# Patient Record
Sex: MALE | Race: WHITE | NOT HISPANIC OR LATINO | Employment: FULL TIME | RURAL
[De-identification: names, ages, dates, MRNs, and addresses within clinical notes are randomized per-mention and may not be internally consistent; named-entity substitution may affect disease eponyms.]

---

## 2017-05-01 ENCOUNTER — CONVERSION ENCOUNTER (OUTPATIENT)
Dept: FAMILY MEDICINE CLINIC | Facility: CLINIC | Age: 37
End: 2017-05-01

## 2018-03-15 ENCOUNTER — CONVERSION ENCOUNTER (OUTPATIENT)
Dept: FAMILY MEDICINE CLINIC | Facility: CLINIC | Age: 38
End: 2018-03-15

## 2019-06-04 VITALS
HEART RATE: 88 BPM | RESPIRATION RATE: 18 BRPM | OXYGEN SATURATION: 97 % | DIASTOLIC BLOOD PRESSURE: 74 MMHG | WEIGHT: 205.25 LBS | HEIGHT: 73 IN | OXYGEN SATURATION: 96 % | RESPIRATION RATE: 18 BRPM | HEART RATE: 104 BPM | BODY MASS INDEX: 27.51 KG/M2 | WEIGHT: 207.6 LBS | SYSTOLIC BLOOD PRESSURE: 111 MMHG | DIASTOLIC BLOOD PRESSURE: 82 MMHG | SYSTOLIC BLOOD PRESSURE: 127 MMHG

## 2019-07-22 ENCOUNTER — OFFICE VISIT (OUTPATIENT)
Dept: FAMILY MEDICINE CLINIC | Facility: CLINIC | Age: 39
End: 2019-07-22

## 2019-07-22 VITALS
WEIGHT: 225.6 LBS | DIASTOLIC BLOOD PRESSURE: 98 MMHG | TEMPERATURE: 98.2 F | OXYGEN SATURATION: 98 % | HEART RATE: 88 BPM | HEIGHT: 72 IN | SYSTOLIC BLOOD PRESSURE: 140 MMHG | BODY MASS INDEX: 30.56 KG/M2 | RESPIRATION RATE: 19 BRPM

## 2019-07-22 DIAGNOSIS — F33.1 MODERATE EPISODE OF RECURRENT MAJOR DEPRESSIVE DISORDER (HCC): Primary | ICD-10-CM

## 2019-07-22 DIAGNOSIS — F41.3 OTHER MIXED ANXIETY DISORDERS: ICD-10-CM

## 2019-07-22 PROBLEM — F32.A DEPRESSION: Status: ACTIVE | Noted: 2019-07-22

## 2019-07-22 PROCEDURE — 99214 OFFICE O/P EST MOD 30 MIN: CPT | Performed by: FAMILY MEDICINE

## 2019-07-22 RX ORDER — CITALOPRAM 20 MG/1
20 TABLET ORAL DAILY
Qty: 30 TABLET | Refills: 1 | Status: SHIPPED | OUTPATIENT
Start: 2019-07-22 | End: 2019-09-19 | Stop reason: SINTOL

## 2019-07-22 RX ORDER — HYDROXYZINE HYDROCHLORIDE 25 MG/1
25 TABLET, FILM COATED ORAL 3 TIMES DAILY PRN
Qty: 60 TABLET | Refills: 1 | Status: SHIPPED | OUTPATIENT
Start: 2019-07-22 | End: 2019-09-19

## 2019-07-22 RX ORDER — GABAPENTIN 100 MG/1
100 CAPSULE ORAL 3 TIMES DAILY PRN
Qty: 60 CAPSULE | Refills: 1 | Status: SHIPPED | OUTPATIENT
Start: 2019-07-22 | End: 2019-08-09 | Stop reason: SDUPTHER

## 2019-08-09 DIAGNOSIS — F41.3 OTHER MIXED ANXIETY DISORDERS: ICD-10-CM

## 2019-08-09 DIAGNOSIS — F33.1 MODERATE EPISODE OF RECURRENT MAJOR DEPRESSIVE DISORDER (HCC): ICD-10-CM

## 2019-08-09 NOTE — TELEPHONE ENCOUNTER
Patient thought appointment was today. He is out of gabapentin 100. He wanted to see if we could call in a script for this. His appointment is 8/19. [phone]

## 2019-08-13 RX ORDER — GABAPENTIN 100 MG/1
100 CAPSULE ORAL 3 TIMES DAILY PRN
Qty: 60 CAPSULE | Refills: 1 | Status: SHIPPED | OUTPATIENT
Start: 2019-08-13 | End: 2019-08-19 | Stop reason: DRUGHIGH

## 2019-08-19 ENCOUNTER — OFFICE VISIT (OUTPATIENT)
Dept: FAMILY MEDICINE CLINIC | Facility: CLINIC | Age: 39
End: 2019-08-19

## 2019-08-19 VITALS
HEART RATE: 92 BPM | SYSTOLIC BLOOD PRESSURE: 119 MMHG | WEIGHT: 229.6 LBS | OXYGEN SATURATION: 95 % | RESPIRATION RATE: 18 BRPM | HEIGHT: 72 IN | DIASTOLIC BLOOD PRESSURE: 79 MMHG | BODY MASS INDEX: 31.1 KG/M2

## 2019-08-19 DIAGNOSIS — F33.0 MILD EPISODE OF RECURRENT MAJOR DEPRESSIVE DISORDER (HCC): Primary | ICD-10-CM

## 2019-08-19 PROBLEM — F10.10 ALCOHOL ABUSE: Status: ACTIVE | Noted: 2019-08-19

## 2019-08-19 PROCEDURE — 99203 OFFICE O/P NEW LOW 30 MIN: CPT | Performed by: FAMILY MEDICINE

## 2019-08-19 RX ORDER — GABAPENTIN 300 MG/1
300 CAPSULE ORAL 3 TIMES DAILY
Qty: 90 CAPSULE | Refills: 2 | Status: SHIPPED | OUTPATIENT
Start: 2019-08-19 | End: 2019-09-19 | Stop reason: SDUPTHER

## 2019-08-19 RX ORDER — CITALOPRAM 40 MG/1
TABLET ORAL DAILY
COMMUNITY
Start: 2015-08-15 | End: 2019-09-19

## 2019-09-19 ENCOUNTER — OFFICE VISIT (OUTPATIENT)
Dept: FAMILY MEDICINE CLINIC | Facility: CLINIC | Age: 39
End: 2019-09-19

## 2019-09-19 VITALS
HEART RATE: 110 BPM | WEIGHT: 233.8 LBS | TEMPERATURE: 98.2 F | RESPIRATION RATE: 19 BRPM | HEIGHT: 72 IN | BODY MASS INDEX: 31.67 KG/M2 | OXYGEN SATURATION: 94 % | DIASTOLIC BLOOD PRESSURE: 83 MMHG | SYSTOLIC BLOOD PRESSURE: 127 MMHG

## 2019-09-19 DIAGNOSIS — K58.0 IRRITABLE BOWEL SYNDROME WITH DIARRHEA: ICD-10-CM

## 2019-09-19 DIAGNOSIS — F33.0 MILD EPISODE OF RECURRENT MAJOR DEPRESSIVE DISORDER (HCC): Primary | ICD-10-CM

## 2019-09-19 PROCEDURE — 99214 OFFICE O/P EST MOD 30 MIN: CPT | Performed by: FAMILY MEDICINE

## 2019-09-19 RX ORDER — DICYCLOMINE HCL 20 MG
20 TABLET ORAL EVERY 6 HOURS
Qty: 120 TABLET | Refills: 1 | Status: SHIPPED | OUTPATIENT
Start: 2019-09-19 | End: 2019-11-20 | Stop reason: SDUPTHER

## 2019-09-19 RX ORDER — GABAPENTIN 400 MG/1
400 CAPSULE ORAL 3 TIMES DAILY
Qty: 90 CAPSULE | Refills: 3 | Status: SHIPPED | OUTPATIENT
Start: 2019-09-19 | End: 2019-11-20 | Stop reason: SDUPTHER

## 2019-11-18 ENCOUNTER — TELEPHONE (OUTPATIENT)
Dept: FAMILY MEDICINE CLINIC | Facility: CLINIC | Age: 39
End: 2019-11-18

## 2019-11-18 NOTE — TELEPHONE ENCOUNTER
Pt states Gabapentin and his stomach medication are working great. He states he does not need a check unless neccessary to get refills of these sent to [facility] [place]

## 2019-11-20 DIAGNOSIS — F33.0 MILD EPISODE OF RECURRENT MAJOR DEPRESSIVE DISORDER (HCC): ICD-10-CM

## 2019-11-20 DIAGNOSIS — K58.0 IRRITABLE BOWEL SYNDROME WITH DIARRHEA: ICD-10-CM

## 2019-11-20 RX ORDER — GABAPENTIN 400 MG/1
400 CAPSULE ORAL 3 TIMES DAILY
Qty: 90 CAPSULE | Refills: 1 | Status: SHIPPED | OUTPATIENT
Start: 2019-11-20 | End: 2020-03-15

## 2019-11-20 RX ORDER — DICYCLOMINE HCL 20 MG
20 TABLET ORAL EVERY 6 HOURS
Qty: 120 TABLET | Refills: 1 | Status: SHIPPED | OUTPATIENT
Start: 2019-11-20 | End: 2020-10-09

## 2019-12-03 PROBLEM — F33.0 MILD EPISODE OF RECURRENT MAJOR DEPRESSIVE DISORDER: Status: ACTIVE | Noted: 2019-12-03

## 2019-12-03 NOTE — ASSESSMENT & PLAN NOTE
Worsened.   He was started on Gabapentin to treat his symptoms.   He was advised to RTC in 3-4 months for followup.

## 2020-03-15 DIAGNOSIS — F33.0 MILD EPISODE OF RECURRENT MAJOR DEPRESSIVE DISORDER (HCC): ICD-10-CM

## 2020-03-15 RX ORDER — GABAPENTIN 400 MG/1
CAPSULE ORAL
Qty: 90 CAPSULE | Refills: 1 | Status: SHIPPED | OUTPATIENT
Start: 2020-03-15 | End: 2020-05-11 | Stop reason: SDUPTHER

## 2020-05-10 DIAGNOSIS — F33.0 MILD EPISODE OF RECURRENT MAJOR DEPRESSIVE DISORDER (HCC): ICD-10-CM

## 2020-05-11 ENCOUNTER — TELEPHONE (OUTPATIENT)
Dept: FAMILY MEDICINE CLINIC | Facility: CLINIC | Age: 40
End: 2020-05-11

## 2020-05-11 ENCOUNTER — TELEMEDICINE (OUTPATIENT)
Dept: FAMILY MEDICINE CLINIC | Facility: CLINIC | Age: 40
End: 2020-05-11

## 2020-05-11 DIAGNOSIS — F33.0 MILD EPISODE OF RECURRENT MAJOR DEPRESSIVE DISORDER (HCC): Primary | ICD-10-CM

## 2020-05-11 PROCEDURE — 99213 OFFICE O/P EST LOW 20 MIN: CPT | Performed by: FAMILY MEDICINE

## 2020-05-11 RX ORDER — GABAPENTIN 400 MG/1
CAPSULE ORAL
Qty: 90 CAPSULE | Refills: 0 | OUTPATIENT
Start: 2020-05-11

## 2020-05-11 RX ORDER — GABAPENTIN 400 MG/1
400 CAPSULE ORAL 3 TIMES DAILY
Qty: 90 CAPSULE | Refills: 3 | Status: SHIPPED | OUTPATIENT
Start: 2020-05-11 | End: 2020-09-09 | Stop reason: SDUPTHER

## 2020-05-11 NOTE — PROGRESS NOTES
Chief Complaint   Patient presents with   • Depression     medication refill      Video Visit    History of Present Illness:  Subjective   Francisco Javier Chavarria is a 40 y.o. male.   Depression   Visit Type: initial  Onset of symptoms: more than 6 months ago  Progression since onset: rapidly worsening  Patient presents with the following symptoms: dizziness, excessive worry, fatigue, feelings of hopelessness, feelings of worthlessness, irritability, nervousness/anxiety, obsessions and weight gain.  Patient is not experiencing: anhedonia, chest pain, choking sensation, compulsions, confusion, decreased concentration, depressed mood, dry mouth, hypersomnia, hyperventilation, impotence, insomnia, malaise, memory impairment, muscle tension, nausea, palpitations, panic, psychomotor agitation, psychomotor retardation, restlessness, shortness of breath, suicidal ideas, suicidal planning, thoughts of death and weight loss.  Frequency of symptoms: constantly   Severity: interfering with daily activities   Aggravated by: family issues and work stress  Sleep quality: good  Nighttime awakenings: several  Risk factors: family history and personality disorder  Patient has a history of: bipolar disorder and depression  No history of: anemia, anxiety/panic attacks, arrhythmia, asthma, CAD, CHF, chronic lung disease, fibromyalgia, hyperthyroidism, suicide attempt, mental illness and substance abuse  Treatment tried: lifestyle changes  Compliance with treatment: good  Past compliance problems: medication issues       8/19/2019- The patient is here today and following up from his last visit. On his last visit he was started on Celexa 20 mg, Hydroxyzine 25mg and Gabapentin 100 mg to help treat his symptoms. The patient states that he took the medication  For about a week and he states that it just did nothing but make him very tired. He states that it made him irritable due to him being so tired. He states that he has had issues with this  before and these types of medication making him so tired. He states that the Gabapentin that he is on helped him with some of his mood but he states he just can not take the medications prescribed for him just for his mood. He states that he took all the Gabapentin but he did not take the other two medications.   9/19/2019- The patient is here today for a follow up on his anxiety and depression. The last time he was here he was stopped on all his medications except his Gabapentin due to side affects he was having taking these medications. He states that the Gabapentin is still working well for him, and he has not had any side affects from taking it. He states that it has helped his moods as well. He states that he ahs his day but he states that overall it has really helped him.   5/11/2020- The patient is calling in today and needs a refill on his Gabapentin. He states that this medication is still working very well for him and he has not had any side affects from taking this medication. Patient seemed very annoyed and I asked him if everything was ok and he just said yes. Patient said the medication is fine and he has no problems with it.     Allergies:  Allergies   Allergen Reactions   • Wasp Venom Hives     The patient states last time this happened, he has never been the same since.   • Seasonal Ic [Cholestatin] Itching     Certain things        Social History:  Social History     Socioeconomic History   • Marital status: Single     Spouse name: Not on file   • Number of children: Not on file   • Years of education: Not on file   • Highest education level: Not on file   Tobacco Use   • Smoking status: Never Smoker   • Smokeless tobacco: Current User   Substance and Sexual Activity   • Alcohol use: Yes   • Drug use: No       Family History:  Family History   Problem Relation Age of Onset   • Depression Mother    • Anxiety disorder Mother    • Other Mother    • Mental illness Mother    • Cancer Father    •  Depression Sister    • Anxiety disorder Sister    • Other Sister        Past Medical History :  Active Ambulatory Problems     Diagnosis Date Noted   • Anxiety 01/28/2015   • Depressive disorder 07/22/2019   • Alcohol abuse 08/19/2019   • Irritable bowel syndrome with diarrhea 09/19/2019   • Mild episode of recurrent major depressive disorder (CMS/HCC) 12/03/2019     Resolved Ambulatory Problems     Diagnosis Date Noted   • No Resolved Ambulatory Problems     Past Medical History:   Diagnosis Date   • Allergic    • Depression    • Headache        Medication List:    Current Outpatient Medications:   •  gabapentin (NEURONTIN) 400 MG capsule, Take 1 capsule by mouth 3 (Three) Times a Day., Disp: 90 capsule, Rfl: 3    Past Surgical History:  History reviewed. No pertinent surgical history.    Review Of Systems:  Review of Systems   Constitutional: Positive for irritability and unexpected weight gain. Negative for activity change, appetite change, fatigue and unexpected weight loss.   HENT: Negative for congestion, postnasal drip, sinus pressure and sore throat.    Eyes: Negative for blurred vision and itching.   Respiratory: Negative for cough, choking, shortness of breath and wheezing.    Cardiovascular: Negative for chest pain and palpitations.   Gastrointestinal: Negative for abdominal pain, constipation, nausea, vomiting and GERD.   Endocrine: Negative for cold intolerance and heat intolerance.   Genitourinary: Negative for difficulty urinating, dysuria, impotence and urinary incontinence.   Musculoskeletal: Negative for back pain, joint swelling and neck pain.   Skin: Negative for color change and rash.   Neurological: Negative for dizziness, speech difficulty, weakness, memory problem and confusion.   Psychiatric/Behavioral: Negative for behavioral problems, decreased concentration, substance abuse, suicidal ideas and depressed mood. The patient is nervous/anxious. The patient does not have insomnia.        The  following portions of the patient's history were reviewed and updated as appropriate: allergies, current medications, past family history, past medical history, past social history, past surgical history and problem list.      Physical Exam:  Vital Signs:  There were no vitals filed for this visit.  There is no height or weight on file to calculate BMI.    Physical Exam    You have chosen to receive care through a telehealth visit.  Do you consent to use a video/audio connection for your medical care today? Yes  Assessment and Plan:  Diagnoses and all orders for this visit:    1. Mild episode of recurrent major depressive disorder (CMS/[facility]) (Primary)  Assessment & Plan:  Psychological condition is unchanged.  Continue current treatment regimen.  Psychological condition  will be reassessed in 3 months.    Orders:  -     gabapentin (NEURONTIN) 400 MG capsule; Take 1 capsule by mouth 3 (Three) Times a Day.  Dispense: 90 capsule; Refill: 3    The use of a video visit has been reviewed with the patient and verbal informed consent has been obtained.    Spent 10 minutes with patient and greater than 50% of visit spent on counseling and coordination of care regarding the patient's illness, along with the pros and cons of various treatment options.

## 2020-06-08 ENCOUNTER — OFFICE VISIT (OUTPATIENT)
Dept: FAMILY MEDICINE CLINIC | Facility: CLINIC | Age: 40
End: 2020-06-08

## 2020-06-08 VITALS
SYSTOLIC BLOOD PRESSURE: 125 MMHG | HEIGHT: 72 IN | TEMPERATURE: 98.7 F | WEIGHT: 222.4 LBS | DIASTOLIC BLOOD PRESSURE: 85 MMHG | OXYGEN SATURATION: 98 % | HEART RATE: 88 BPM | RESPIRATION RATE: 18 BRPM | BODY MASS INDEX: 30.12 KG/M2

## 2020-06-08 DIAGNOSIS — F33.1 MODERATE EPISODE OF RECURRENT MAJOR DEPRESSIVE DISORDER (HCC): Primary | ICD-10-CM

## 2020-06-08 PROCEDURE — 99214 OFFICE O/P EST MOD 30 MIN: CPT | Performed by: FAMILY MEDICINE

## 2020-06-08 NOTE — ASSESSMENT & PLAN NOTE
Psychological condition is worsening.  Medication changes per orders.  Psychological condition  will be reassessed in 4 weeks.  He was advised to add Trintellix to his medications.

## 2020-06-08 NOTE — PROGRESS NOTES
Chief Complaint   Patient presents with   • Depression       History of Present Illness:  Subjective   Francisco Javier Chavarria is a 40 y.o. male.   Depression   Visit Type: initial  Onset of symptoms: more than 6 months ago  Progression since onset: rapidly worsening  Patient presents with the following symptoms: depressed mood, dizziness, excessive worry, fatigue, feelings of hopelessness, feelings of worthlessness, irritability, nervousness/anxiety, obsessions and weight gain.  Patient is not experiencing: anhedonia, chest pain, choking sensation, compulsions, confusion, decreased concentration, dry mouth, hypersomnia, hyperventilation, impotence, insomnia, malaise, memory impairment, muscle tension, nausea, palpitations, panic, psychomotor agitation, psychomotor retardation, restlessness, shortness of breath, suicidal ideas, suicidal planning, thoughts of death and weight loss.  Frequency of symptoms: constantly   Severity: interfering with daily activities   Aggravated by: family issues and work stress  Sleep quality: good  Nighttime awakenings: several  Risk factors: family history and personality disorder  Patient has a history of: bipolar disorder and depression  No history of: anemia, anxiety/panic attacks, arrhythmia, asthma, CAD, CHF, chronic lung disease, fibromyalgia, hyperthyroidism, suicide attempt, mental illness and substance abuse  Treatment tried: lifestyle changes  Compliance with treatment: good  Past compliance problems: medication issues       8/19/2019- The patient is here today and following up from his last visit. On his last visit he was started on Celexa 20 mg, Hydroxyzine 25mg and Gabapentin 100 mg to help treat his symptoms. The patient states that he took the medication  For about a week and he states that it just did nothing but make him very tired. He states that it made him irritable due to him being so tired. He states that he has had issues with this before and these types of medication  making him so tired. He states that the Gabapentin that he is on helped him with some of his mood but he states he just can not take the medications prescribed for him just for his mood. He states that he took all the Gabapentin but he did not take the other two medications.   9/19/2019- The patient is here today for a follow up on his anxiety and depression. The last time he was here he was stopped on all his medications except his Gabapentin due to side affects he was having taking these medications. He states that the Gabapentin is still working well for him, and he has not had any side affects from taking it. He states that it has helped his moods as well. He states that he ahs his day but he states that overall it has really helped him.   5/11/2020- The patient is calling in today and needs a refill on his Gabapentin. He states that this medication is still working very well for him and he has not had any side affects from taking this medication. Patient seemed very annoyed and I asked him if everything was ok and he just said yes. Patient said the medication is fine and he has no problems with it.   6/8/2020- The patient is here today and following up on his depression. He states that there has been a lot going on. He states that he has no ambition and he states that he is very confused and he states that he has recently lost his girlfriend and he states that he lost his job. He states that he has not had any thought of self harm. He states that he feels this dose is not working for him anymore and he states that he is on a downward spiral. He states that he does not understand why his girlfriend left him as she has not told him. He states that he lost his job as they closed due to COVID. He states that he has not really wanted to leave his house but he states that he leaves and he states that when he does leave he states that he feels he needs to go back home. He states that his mind will not stop. He states  that all he thinks about is his girlfriend but he states that at the same time he doesn't want her. He states that he has applied for several jobs but there is none and no one is calling him back. He states that he went to fill out application the other day and could not even think on how to spell anything.     Allergies:  Allergies   Allergen Reactions   • Wasp Venom Hives     The patient states last time this happened, he has never been the same since.   • Seasonal Ic [Cholestatin] Itching     Certain things        Social History:  Social History     Socioeconomic History   • Marital status: Single     Spouse name: Not on file   • Number of children: Not on file   • Years of education: Not on file   • Highest education level: Not on file   Tobacco Use   • Smoking status: Never Smoker   • Smokeless tobacco: Current User   Substance and Sexual Activity   • Alcohol use: Yes   • Drug use: No       Family History:  Family History   Problem Relation Age of Onset   • Depression Mother    • Anxiety disorder Mother    • Other Mother    • Mental illness Mother    • Cancer Father    • Depression Sister    • Anxiety disorder Sister    • Other Sister        Past Medical History :  Active Ambulatory Problems     Diagnosis Date Noted   • Anxiety 01/28/2015   • Depressive disorder 07/22/2019   • Alcohol abuse 08/19/2019   • Irritable bowel syndrome with diarrhea 09/19/2019   • Mild episode of recurrent major depressive disorder (CMS/[facility]) 12/03/2019     Resolved Ambulatory Problems     Diagnosis Date Noted   • No Resolved Ambulatory Problems     Past Medical History:   Diagnosis Date   • Allergic    • Depression    • Headache        Medication List:    Current Outpatient Medications:   •  gabapentin (NEURONTIN) 400 MG capsule, Take 1 capsule by mouth 3 (Three) Times a Day., Disp: 90 capsule, Rfl: 3  •  Vortioxetine HBr (Trintellix) 10 MG tablet, Take 10 mg by mouth Daily., Disp: 30 tablet, Rfl: 2    Past Surgical History:  History  "reviewed. No pertinent surgical history.    Review Of Systems:  Review of Systems   Constitutional: Positive for irritability and unexpected weight gain. Negative for activity change, appetite change, fatigue and unexpected weight loss.   HENT: Negative for congestion, postnasal drip, sinus pressure and sore throat.    Eyes: Negative for blurred vision and itching.   Respiratory: Negative for cough, choking, shortness of breath and wheezing.    Cardiovascular: Negative for chest pain and palpitations.   Gastrointestinal: Negative for abdominal pain, constipation, nausea, vomiting and GERD.   Endocrine: Negative for cold intolerance and heat intolerance.   Genitourinary: Negative for difficulty urinating, dysuria, impotence and urinary incontinence.   Musculoskeletal: Negative for back pain, joint swelling and neck pain.   Skin: Negative for color change and rash.   Neurological: Negative for dizziness, speech difficulty, weakness, memory problem and confusion.   Psychiatric/Behavioral: Positive for depressed mood and stress. Negative for behavioral problems, decreased concentration, self-injury, substance abuse and suicidal ideas. The patient is nervous/anxious. The patient does not have insomnia.        The following portions of the patient's history were reviewed and updated as appropriate: allergies, current medications, past family history, past medical history, past social history, past surgical history and problem list.      Physical Exam:  Vital Signs:  Vitals:    06/08/20 0816   BP: 125/85   Pulse: 88   Resp: 18   Temp: 98.7 °F (37.1 °C)   SpO2: 98%   Weight: 101 kg (222 lb 6.4 oz)   Height: 182.9 cm (72\")     Body mass index is 30.16 kg/m².    Physical Exam   Constitutional: He is oriented to person, place, and time. He appears well-developed and well-nourished.   HENT:   Head: Normocephalic.   Right Ear: External ear normal.   Left Ear: External ear normal.   Nose: Nose normal.   Eyes: Conjunctivae are " normal.   Neck: Normal range of motion. Neck supple.   Cardiovascular: Normal rate and regular rhythm.   Pulmonary/Chest: Effort normal and breath sounds normal.   Musculoskeletal: Normal range of motion.   Neurological: He is alert and oriented to person, place, and time.   Skin: Skin is warm and dry. Capillary refill takes less than 2 seconds.   Vitals reviewed.      You have chosen to receive care through a telehealth visit.  Do you consent to use a video/audio connection for your medical care today? Yes  Assessment and Plan:  Diagnoses and all orders for this visit:    1. Moderate episode of recurrent major depressive disorder (CMS/[facility]) (Primary)  Assessment & Plan:  Psychological condition is worsening.  Medication changes per orders.  Psychological condition  will be reassessed in 4 weeks.  He was advised to add Trintellix to his medications.     Orders:  -     Vortioxetine HBr (Trintellix) 10 MG tablet; Take 10 mg by mouth Daily.  Dispense: 30 tablet; Refill: 2      Spent 26 minutes with patient and greater than 50% of visit spent on counseling and coordination of care regarding the patient's illness, along with the pros and cons of various treatment options.

## 2020-09-09 DIAGNOSIS — F33.0 MILD EPISODE OF RECURRENT MAJOR DEPRESSIVE DISORDER (HCC): ICD-10-CM

## 2020-09-09 RX ORDER — GABAPENTIN 400 MG/1
400 CAPSULE ORAL 3 TIMES DAILY
Qty: 90 CAPSULE | Refills: 1 | Status: SHIPPED | OUTPATIENT
Start: 2020-09-09 | End: 2020-10-29 | Stop reason: SDUPTHER

## 2020-09-09 NOTE — TELEPHONE ENCOUNTER
Patient doesn't currently have insurance and therefore cannot come in to be seen until he gets insurance back.

## 2020-10-09 DIAGNOSIS — K58.0 IRRITABLE BOWEL SYNDROME WITH DIARRHEA: ICD-10-CM

## 2020-10-09 RX ORDER — DICYCLOMINE HCL 20 MG
TABLET ORAL
Qty: 120 TABLET | Refills: 0 | Status: SHIPPED | OUTPATIENT
Start: 2020-10-09 | End: 2020-10-29 | Stop reason: SDUPTHER

## 2020-10-29 ENCOUNTER — OFFICE VISIT (OUTPATIENT)
Dept: FAMILY MEDICINE CLINIC | Facility: CLINIC | Age: 40
End: 2020-10-29

## 2020-10-29 VITALS
HEART RATE: 87 BPM | OXYGEN SATURATION: 98 % | BODY MASS INDEX: 30.42 KG/M2 | HEIGHT: 72 IN | SYSTOLIC BLOOD PRESSURE: 118 MMHG | WEIGHT: 224.6 LBS | TEMPERATURE: 98.3 F | DIASTOLIC BLOOD PRESSURE: 76 MMHG | RESPIRATION RATE: 18 BRPM

## 2020-10-29 DIAGNOSIS — K58.0 IRRITABLE BOWEL SYNDROME WITH DIARRHEA: ICD-10-CM

## 2020-10-29 DIAGNOSIS — F33.0 MILD EPISODE OF RECURRENT MAJOR DEPRESSIVE DISORDER (HCC): Primary | ICD-10-CM

## 2020-10-29 DIAGNOSIS — H92.01 RIGHT EAR PAIN: ICD-10-CM

## 2020-10-29 PROCEDURE — 99213 OFFICE O/P EST LOW 20 MIN: CPT | Performed by: FAMILY MEDICINE

## 2020-10-29 RX ORDER — DICYCLOMINE HCL 20 MG
20 TABLET ORAL EVERY 6 HOURS
Qty: 120 TABLET | Refills: 3 | Status: SHIPPED | OUTPATIENT
Start: 2020-10-29 | End: 2021-03-19 | Stop reason: SDUPTHER

## 2020-10-29 RX ORDER — GABAPENTIN 400 MG/1
400 CAPSULE ORAL 3 TIMES DAILY
Qty: 90 CAPSULE | Refills: 2 | Status: SHIPPED | OUTPATIENT
Start: 2020-10-29 | End: 2021-02-22 | Stop reason: SDUPTHER

## 2020-10-29 NOTE — PROGRESS NOTES
Chief Complaint   Patient presents with   • Depression   • Earache     Right        History of Present Illness:  Subjective   Francisco Javier Chavarria is a 40 y.o. male.   8/19/2019- The patient is here today and following up from his last visit. On his last visit he was started on Celexa 20 mg, Hydroxyzine 25mg and Gabapentin 100 mg to help treat his symptoms. The patient states that he took the medication  For about a week and he states that it just did nothing but make him very tired. He states that it made him irritable due to him being so tired. He states that he has had issues with this before and these types of medication making him so tired. He states that the Gabapentin that he is on helped him with some of his mood but he states he just can not take the medications prescribed for him just for his mood. He states that he took all the Gabapentin but he did not take the other two medications.   9/19/2019- The patient is here today for a follow up on his anxiety and depression. The last time he was here he was stopped on all his medications except his Gabapentin due to side affects he was having taking these medications. He states that the Gabapentin is still working well for him, and he has not had any side affects from taking it. He states that it has helped his moods as well. He states that he ahs his day but he states that overall it has really helped him.   5/11/2020- The patient is calling in today and needs a refill on his Gabapentin. He states that this medication is still working very well for him and he has not had any side affects from taking this medication. Patient seemed very annoyed and I asked him if everything was ok and he just said yes. Patient said the medication is fine and he has no problems with it.   6/8/2020- The patient is here today and following up on his depression. He states that there has been a lot going on. He states that he has no ambition and he states that he is very confused and he  states that he has recently lost his girlfriend and he states that he lost his job. He states that he has not had any thought of self harm. He states that he feels this dose is not working for him anymore and he states that he is on a downward spiral. He states that he does not understand why his girlfriend left him as she has not told him. He states that he lost his job as they closed due to COVID. He states that he has not really wanted to leave his house but he states that he leaves and he states that when he does leave he states that he feels he needs to go back home. He states that his mind will not stop. He states that all he thinks about is his girlfriend but he states that at the same time he doesn't want her. He states that he has applied for several jobs but there is none and no one is calling him back. He states that he went to fill out application the other day and could not even think on how to spell anything.   10/29/2020- the patient is here today and following up on his depression. He is currently on gabapentin to treat his depression and the last time he was here he was started on trintellix and he states that it did nothing for him so he stopped taking it. He states that he has a lot of difficulty taking the other medications and he states that the gabapentin has been the only thing that he has found that works for him. He states that he has not had nay issues taking this medication and he states that he feels it is working well for him but at the same time it is not consistent for him, but he states that it does work at the same time. He states that he wishes there was something that would level him out.       Depression  Visit Type: initial  Onset of symptoms: more than 6 months ago  Progression since onset: rapidly worsening  Patient presents with the following symptoms: depressed mood, dizziness, excessive worry, fatigue, feelings of hopelessness, feelings of worthlessness, irritability,  nervousness/anxiety, obsessions and weight gain.  Patient is not experiencing: anhedonia, chest pain, choking sensation, compulsions, confusion, decreased concentration, dry mouth, hypersomnia, hyperventilation, impotence, insomnia, malaise, memory impairment, muscle tension, nausea, palpitations, panic, psychomotor agitation, psychomotor retardation, restlessness, shortness of breath, suicidal ideas, suicidal planning, thoughts of death and weight loss.  Frequency of symptoms: constantly   Severity: interfering with daily activities   Aggravated by: family issues and work stress  Sleep quality: good  Nighttime awakenings: several  Risk factors: family history and personality disorder  Patient has a history of: bipolar disorder and depression  No history of: anemia, anxiety/panic attacks, arrhythmia, asthma, CAD, CHF, chronic lung disease, fibromyalgia, hyperthyroidism, suicide attempt, mental illness and substance abuse  Treatment tried: lifestyle changes  Compliance with treatment: good  Past compliance problems: medication issues      Earache   There is pain in the right ear. This is a chronic problem. The current episode started more than 1 year ago. Episode frequency: intermittingly  The problem has been gradually worsening. The fever has been present for less than 1 day. The pain is at a severity of 9/10. The pain is severe. Associated symptoms include ear discharge (He states that for years he always gets something out of his right ear ) and headaches. Pertinent negatives include no abdominal pain, coughing, diarrhea, hearing loss, neck pain, rash, rhinorrhea, sore throat or vomiting. Associated symptoms comments: He states that for a while now he has been getting this very intense pain in his right ear and he states that it is very intermittent but he states that when he gets it he has to apply a lot of pressure to the right side of his face and ear and he states that the pressure usually makes him tear up  but he states that if he does this and falls asleep when he wakes up he is better. He states that at one point he was told this could be from arthritis of the jaw but he states that every morning he uses a qtip and or toilet paper and he states that he always gets something out of it. He states that he is concerned as he states that the pain is happening more and more often. . He has tried nothing for the symptoms. The treatment provided no relief. There is no history of a chronic ear infection, hearing loss or a tympanostomy tube.      Allergies:  Allergies   Allergen Reactions   • Wasp Venom Hives     The patient states last time this happened, he has never been the same since.   • Seasonal Ic [Cholestatin] Itching     Certain things        Social History:  Social History     Socioeconomic History   • Marital status: Single     Spouse name: Not on file   • Number of children: Not on file   • Years of education: Not on file   • Highest education level: Not on file   Tobacco Use   • Smoking status: Never Smoker   • Smokeless tobacco: Current User   Substance and Sexual Activity   • Alcohol use: Yes   • Drug use: No       Family History:  Family History   Problem Relation Age of Onset   • Depression Mother    • Anxiety disorder Mother    • Other Mother    • Mental illness Mother    • Cancer Father    • Depression Sister    • Anxiety disorder Sister    • Other Sister        Past Medical History :  Active Ambulatory Problems     Diagnosis Date Noted   • Anxiety 01/28/2015   • Depressive disorder 07/22/2019   • Alcohol abuse 08/19/2019   • Irritable bowel syndrome with diarrhea 09/19/2019   • Mild episode of recurrent major depressive disorder (CMS/[facility]) 12/03/2019   • Right ear pain 10/29/2020     Resolved Ambulatory Problems     Diagnosis Date Noted   • No Resolved Ambulatory Problems     Past Medical History:   Diagnosis Date   • Allergic    • Depression    • Headache        Medication List:    Current Outpatient  Medications:   •  dicyclomine (BENTYL) 20 MG tablet, Take 1 tablet by mouth Every 6 (Six) Hours., Disp: 120 tablet, Rfl: 3  •  gabapentin (NEURONTIN) 400 MG capsule, Take 1 capsule by mouth 3 (Three) Times a Day., Disp: 90 capsule, Rfl: 2    Past Surgical History:  History reviewed. No pertinent surgical history.    Review Of Systems:  Review of Systems   Constitutional: Positive for irritability and unexpected weight gain. Negative for activity change, appetite change, fatigue and unexpected weight loss.   HENT: Positive for ear discharge (He states that for years he always gets something out of his right ear ) and ear pain. Negative for congestion, hearing loss, postnasal drip, rhinorrhea, sinus pressure and sore throat.    Eyes: Negative for blurred vision and itching.   Respiratory: Negative for cough, choking, shortness of breath and wheezing.    Cardiovascular: Negative for chest pain and palpitations.   Gastrointestinal: Negative for abdominal pain, constipation, diarrhea, nausea, vomiting and GERD.   Endocrine: Negative for cold intolerance and heat intolerance.   Genitourinary: Negative for difficulty urinating, dysuria, impotence and urinary incontinence.   Musculoskeletal: Negative for back pain, joint swelling and neck pain.   Skin: Negative for color change and rash.   Neurological: Negative for dizziness, speech difficulty, weakness, memory problem and confusion.   Psychiatric/Behavioral: Positive for depressed mood and stress. Negative for behavioral problems, decreased concentration, self-injury, substance abuse and suicidal ideas. The patient is nervous/anxious. The patient does not have insomnia.        The following portions of the patient's history were reviewed and updated as appropriate: allergies, current medications, past family history, past medical history, past social history, past surgical history and problem list.      Physical Exam:  Vital Signs:  Vitals:    10/29/20 1601   BP: 118/76  "  Pulse: 87   Resp: 18   Temp: 98.3 °F (36.8 °C)   SpO2: 98%   Weight: 102 kg (224 lb 9.6 oz)   Height: 182.9 cm (72\")     Body mass index is 30.46 kg/m².    Physical Exam   Constitutional: He is oriented to person, place, and time. He appears well-developed and well-nourished.   HENT:   Head: Normocephalic.   Right Ear: External ear normal.   Left Ear: External ear normal.   Nose: Nose normal.   Eyes: Conjunctivae are normal.   Neck: Normal range of motion. Neck supple.   Cardiovascular: Normal rate and regular rhythm.   Pulmonary/Chest: Effort normal and breath sounds normal.   Musculoskeletal: Normal range of motion.   Neurological: He is alert and oriented to person, place, and time.   Skin: Skin is warm and dry. Capillary refill takes less than 2 seconds.   Vitals reviewed.        Assessment and Plan:  Diagnoses and all orders for this visit:    1. Mild episode of recurrent major depressive disorder (CMS/HCC) (Primary)  Assessment & Plan:  Psychological condition is unchanged.  Continue current treatment regimen.  Psychological condition  will be reassessed in 3 months.    Orders:  -     gabapentin (NEURONTIN) 400 MG capsule; Take 1 capsule by mouth 3 (Three) Times a Day.  Dispense: 90 capsule; Refill: 2    2. Irritable bowel syndrome with diarrhea  Assessment & Plan:  He was given refills on Bentyl to help with his symptoms.    Orders:  -     dicyclomine (BENTYL) 20 MG tablet; Take 1 tablet by mouth Every 6 (Six) Hours.  Dispense: 120 tablet; Refill: 3    3. Right ear pain  Assessment & Plan:  This pain may be due to TMJ.  He was advised to RTC if the pain starts.                      "

## 2021-02-22 DIAGNOSIS — F33.0 MILD EPISODE OF RECURRENT MAJOR DEPRESSIVE DISORDER (HCC): ICD-10-CM

## 2021-02-22 RX ORDER — GABAPENTIN 400 MG/1
400 CAPSULE ORAL 3 TIMES DAILY
Qty: 90 CAPSULE | Refills: 1 | Status: SHIPPED | OUTPATIENT
Start: 2021-02-22 | End: 2021-03-19 | Stop reason: SDUPTHER

## 2021-02-22 NOTE — TELEPHONE ENCOUNTER
Caller: Francisco Javier Chavarria    Relationship: Self    Best call back number:[phone]       Medication needed:   Requested Prescriptions     Pending Prescriptions Disp Refills   • gabapentin (NEURONTIN) 400 MG capsule 90 capsule 2     Sig: Take 1 capsule by mouth 3 (Three) Times a Day.       When do you need the refill by: ASAP        Does the patient have less than a 3 day supply:  [x] Yes  [] No    What is the patient's preferred pharmacy: [facility] [address] [place], [place] [address], [place] - [address] [facility] [address]  - [phone] [facility] [phone] FX

## 2021-03-19 ENCOUNTER — OFFICE VISIT (OUTPATIENT)
Dept: FAMILY MEDICINE CLINIC | Facility: CLINIC | Age: 41
End: 2021-03-19

## 2021-03-19 VITALS
HEIGHT: 72 IN | OXYGEN SATURATION: 99 % | DIASTOLIC BLOOD PRESSURE: 97 MMHG | SYSTOLIC BLOOD PRESSURE: 166 MMHG | WEIGHT: 226 LBS | TEMPERATURE: 98 F | RESPIRATION RATE: 18 BRPM | HEART RATE: 83 BPM | BODY MASS INDEX: 30.61 KG/M2

## 2021-03-19 DIAGNOSIS — K58.0 IRRITABLE BOWEL SYNDROME WITH DIARRHEA: ICD-10-CM

## 2021-03-19 DIAGNOSIS — F33.0 MILD EPISODE OF RECURRENT MAJOR DEPRESSIVE DISORDER (HCC): ICD-10-CM

## 2021-03-19 PROCEDURE — 99214 OFFICE O/P EST MOD 30 MIN: CPT | Performed by: FAMILY MEDICINE

## 2021-03-19 NOTE — PROGRESS NOTES
Chief Complaint  Depression (medication follow up)    Subjective    History of Present Illness        Francisco Javier Chavarria presents to [facility] for   History of Present Illness   Francisco Javier Chavarria is a 40 y.o. male.   8/19/2019- The patient is here today and following up from his last visit. On his last visit he was started on Celexa 20 mg, Hydroxyzine 25mg and Gabapentin 100 mg to help treat his symptoms. The patient states that he took the medication  For about a week and he states that it just did nothing but make him very tired. He states that it made him irritable due to him being so tired. He states that he has had issues with this before and these types of medication making him so tired. He states that the Gabapentin that he is on helped him with some of his mood but he states he just can not take the medications prescribed for him just for his mood. He states that he took all the Gabapentin but he did not take the other two medications.   9/19/2019- The patient is here today for a follow up on his anxiety and depression. The last time he was here he was stopped on all his medications except his Gabapentin due to side affects he was having taking these medications. He states that the Gabapentin is still working well for him, and he has not had any side affects from taking it. He states that it has helped his moods as well. He states that he ahs his day but he states that overall it has really helped him.   5/11/2020- The patient is calling in today and needs a refill on his Gabapentin. He states that this medication is still working very well for him and he has not had any side affects from taking this medication. Patient seemed very annoyed and I asked him if everything was ok and he just said yes. Patient said the medication is fine and he has no problems with it.   6/8/2020- The patient is here today and following up on his depression. He states that there has been a lot going  on. He states that he has no ambition and he states that he is very confused and he states that he has recently lost his girlfriend and he states that he lost his job. He states that he has not had any thought of self harm. He states that he feels this dose is not working for him anymore and he states that he is on a downward spiral. He states that he does not understand why his girlfriend left him as she has not told him. He states that he lost his job as they closed due to COVID. He states that he has not really wanted to leave his house but he states that he leaves and he states that when he does leave he states that he feels he needs to go back home. He states that his mind will not stop. He states that all he thinks about is his girlfriend but he states that at the same time he doesn't want her. He states that he has applied for several jobs but there is none and no one is calling him back. He states that he went to fill out application the other day and could not even think on how to spell anything.   10/29/2020- the patient is here today and following up on his depression. He is currently on gabapentin to treat his depression and the last time he was here he was started on trintellix and he states that it did nothing for him so he stopped taking it. He states that he has a lot of difficulty taking the other medications and he states that the gabapentin has been the only thing that he has found that works for him. He states that he has not had nay issues taking this medication and he states that he feels it is working well for him but at the same time it is not consistent for him, but he states that it does work at the same time. He states that he wishes there was something that would level him out.   3/19/21: Patient is here today to follow up on his medication. He is on Gabapentin 400 mg TID for this and reports it works well for him most of the time. He reports that even some days this doesn't help all the  "way.     Depression:  Visit Type: initial  Onset of symptoms: more than 6 months ago  Progression since onset: rapidly worsening  Patient presents with the following symptoms: depressed mood, dizziness, excessive worry, fatigue, feelings of hopelessness, feelings of worthlessness, irritability, nervousness/anxiety, obsessions and weight gain.  Patient is not experiencing: anhedonia, chest pain, choking sensation, compulsions, confusion, decreased concentration, dry mouth, hypersomnia, hyperventilation, impotence, insomnia, malaise, memory impairment, muscle tension, nausea, palpitations, panic, psychomotor agitation, psychomotor retardation, restlessness, shortness of breath, suicidal ideas, suicidal planning, thoughts of death and weight loss.  Frequency of symptoms: constantly   Severity: interfering with daily activities   Aggravated by: family issues and work stress  Sleep quality: good  Nighttime awakenings: several  Risk factors: family history and personality disorder  Patient has a history of: bipolar disorder and depression  No history of: anemia, anxiety/panic attacks, arrhythmia, asthma, CAD, CHF, chronic lung disease, fibromyalgia, hyperthyroidism, suicide attempt, mental illness and substance abuse  Treatment tried: lifestyle changes  Compliance with treatment: good  Past compliance problems: medication issues      Objective   Vital Signs:   Visit Vitals  /97   Pulse 83   Temp 98 °F (36.7 °C)   Resp 18   Ht 182.9 cm (72\")   Wt 103 kg (226 lb)   SpO2 99%   BMI 30.65 kg/m²       Physical Exam  Vitals reviewed.   Constitutional:       Appearance: He is well-developed.   HENT:      Head: Normocephalic.      Right Ear: External ear normal.      Left Ear: External ear normal.      Nose: Nose normal.   Eyes:      Conjunctiva/sclera: Conjunctivae normal.   Cardiovascular:      Rate and Rhythm: Normal rate and regular rhythm.   Pulmonary:      Effort: Pulmonary effort is normal.      Breath sounds: Normal " breath sounds.   Musculoskeletal:         General: Normal range of motion.      Cervical back: Normal range of motion and neck supple.   Skin:     General: Skin is warm and dry.      Capillary Refill: Capillary refill takes less than 2 seconds.   Neurological:      Mental Status: He is alert and oriented to person, place, and time.            Result Review :                    Assessment and Plan      Diagnoses and all orders for this visit:    1. Mild episode of recurrent major depressive disorder (CMS/HCC)  Assessment & Plan:  Patient's depression is recurrent and is moderate without psychosis. Their depression is currently active and the condition is improving with treatment. This will be reassessed in 3 months. F/U as described:patient will continue current medication therapy, patient was prescribed an antidepressant medicine and patient depression is being managed by their pcp.    Orders:  -     gabapentin (NEURONTIN) 400 MG capsule; Take 1 capsule by mouth 3 (Three) Times a Day.  Dispense: 90 capsule; Refill: 3    2. Irritable bowel syndrome with diarrhea  Assessment & Plan:  IBS is stable with Bentyl.  Patient given refill on his medication.  Patient encouraged return to clinic 4 months.    Orders:  -     dicyclomine (BENTYL) 20 MG tablet; Take 1 tablet by mouth Every 6 (Six) Hours.  Dispense: 120 tablet; Refill: 3           Follow Up   No follow-ups on file.  Patient was given instructions and counseling regarding his condition or for health maintenance advice. Please see specific information pulled into the AVS if appropriate.

## 2021-03-20 RX ORDER — GABAPENTIN 400 MG/1
400 CAPSULE ORAL 3 TIMES DAILY
Qty: 90 CAPSULE | Refills: 3 | Status: SHIPPED | OUTPATIENT
Start: 2021-03-20 | End: 2021-08-27

## 2021-03-20 RX ORDER — DICYCLOMINE HCL 20 MG
20 TABLET ORAL EVERY 6 HOURS
Qty: 120 TABLET | Refills: 3 | Status: SHIPPED | OUTPATIENT
Start: 2021-03-20

## 2021-03-21 NOTE — ASSESSMENT & PLAN NOTE
Patient's depression is recurrent and is moderate without psychosis. Their depression is currently active and the condition is improving with treatment. This will be reassessed in 3 months. F/U as described:patient will continue current medication therapy, patient was prescribed an antidepressant medicine and patient depression is being managed by their pcp.

## 2021-03-21 NOTE — ASSESSMENT & PLAN NOTE
IBS is stable with Bentyl.  Patient given refill on his medication.  Patient encouraged return to clinic 4 months.

## 2021-04-22 ENCOUNTER — CLINICAL SUPPORT (OUTPATIENT)
Dept: FAMILY MEDICINE CLINIC | Facility: CLINIC | Age: 41
End: 2021-04-22

## 2021-04-22 DIAGNOSIS — R53.81 MALAISE AND FATIGUE: Primary | ICD-10-CM

## 2021-04-22 DIAGNOSIS — R53.83 MALAISE AND FATIGUE: Primary | ICD-10-CM

## 2021-04-22 PROCEDURE — 36415 COLL VENOUS BLD VENIPUNCTURE: CPT | Performed by: FAMILY MEDICINE

## 2021-04-22 NOTE — PROGRESS NOTES
Site care done- cleaned with alcohol swab, procedure tolerated well, dressing applied. Venipuncture was obtained after 2 time(s). 5 tubes were drawn. Needle gauge used was 21.

## 2021-04-25 LAB
25(OH)D3+25(OH)D2 SERPL-MCNC: 95 NG/ML (ref 30–100)
ALBUMIN SERPL-MCNC: 5 G/DL (ref 4–5)
ALBUMIN/GLOB SERPL: 1.7 {RATIO} (ref 1.2–2.2)
ALP SERPL-CCNC: 101 IU/L (ref 39–117)
ALT SERPL-CCNC: 36 IU/L (ref 0–44)
AST SERPL-CCNC: 25 IU/L (ref 0–40)
BASOPHILS # BLD AUTO: 0.1 X10E3/UL (ref 0–0.2)
BASOPHILS NFR BLD AUTO: 1 %
BILIRUB SERPL-MCNC: 0.4 MG/DL (ref 0–1.2)
BUN SERPL-MCNC: 9 MG/DL (ref 6–24)
BUN/CREAT SERPL: 8 (ref 9–20)
CALCIUM SERPL-MCNC: 10.1 MG/DL (ref 8.7–10.2)
CHLORIDE SERPL-SCNC: 101 MMOL/L (ref 96–106)
CO2 SERPL-SCNC: 26 MMOL/L (ref 20–29)
CREAT SERPL-MCNC: 1.06 MG/DL (ref 0.76–1.27)
EOSINOPHIL # BLD AUTO: 0.1 X10E3/UL (ref 0–0.4)
EOSINOPHIL NFR BLD AUTO: 2 %
ERYTHROCYTE [DISTWIDTH] IN BLOOD BY AUTOMATED COUNT: 12.4 % (ref 11.6–15.4)
FOLATE SERPL-MCNC: 7 NG/ML
GLOBULIN SER CALC-MCNC: 3 G/DL (ref 1.5–4.5)
GLUCOSE SERPL-MCNC: 77 MG/DL (ref 65–99)
HCT VFR BLD AUTO: 42.2 % (ref 37.5–51)
HGB BLD-MCNC: 14.3 G/DL (ref 13–17.7)
IMM GRANULOCYTES # BLD AUTO: 0 X10E3/UL (ref 0–0.1)
IMM GRANULOCYTES NFR BLD AUTO: 0 %
LYMPHOCYTES # BLD AUTO: 1.6 X10E3/UL (ref 0.7–3.1)
LYMPHOCYTES NFR BLD AUTO: 20 %
MCH RBC QN AUTO: 29.9 PG (ref 26.6–33)
MCHC RBC AUTO-ENTMCNC: 33.9 G/DL (ref 31.5–35.7)
MCV RBC AUTO: 88 FL (ref 79–97)
MONOCYTES # BLD AUTO: 0.7 X10E3/UL (ref 0.1–0.9)
MONOCYTES NFR BLD AUTO: 9 %
NEUTROPHILS # BLD AUTO: 5.6 X10E3/UL (ref 1.4–7)
NEUTROPHILS NFR BLD AUTO: 68 %
PLATELET # BLD AUTO: 473 X10E3/UL (ref 150–450)
POTASSIUM SERPL-SCNC: 4.4 MMOL/L (ref 3.5–5.2)
PROT SERPL-MCNC: 8 G/DL (ref 6–8.5)
RBC # BLD AUTO: 4.79 X10E6/UL (ref 4.14–5.8)
SODIUM SERPL-SCNC: 142 MMOL/L (ref 134–144)
SPECIMEN STATUS: NORMAL
TESTOST FREE SERPL-MCNC: 5.9 PG/ML (ref 6.8–21.5)
TESTOST SERPL-MCNC: 391 NG/DL (ref 264–916)
TSH SERPL DL<=0.005 MIU/L-ACNC: 1.21 UIU/ML (ref 0.45–4.5)
VIT B1 BLD-SCNC: NORMAL NMOL/L
VIT B12 SERPL-MCNC: 1047 PG/ML (ref 232–1245)
WBC # BLD AUTO: 8.1 X10E3/UL (ref 3.4–10.8)

## 2021-04-28 ENCOUNTER — OFFICE VISIT (OUTPATIENT)
Dept: FAMILY MEDICINE CLINIC | Facility: CLINIC | Age: 41
End: 2021-04-28

## 2021-04-28 VITALS
WEIGHT: 219 LBS | HEIGHT: 72 IN | HEART RATE: 98 BPM | RESPIRATION RATE: 18 BRPM | BODY MASS INDEX: 29.66 KG/M2 | OXYGEN SATURATION: 97 % | TEMPERATURE: 97.1 F | SYSTOLIC BLOOD PRESSURE: 117 MMHG | DIASTOLIC BLOOD PRESSURE: 84 MMHG

## 2021-04-28 DIAGNOSIS — E34.9 TESTOSTERONE DEFICIENCY: ICD-10-CM

## 2021-04-28 DIAGNOSIS — Z00.00 PHYSICAL EXAM: Primary | ICD-10-CM

## 2021-04-28 DIAGNOSIS — F33.0 MILD EPISODE OF RECURRENT MAJOR DEPRESSIVE DISORDER (HCC): ICD-10-CM

## 2021-04-28 PROBLEM — T63.461A STING OF HORNETS, WASPS, AND BEES CAUSING POISONING AND TOXIC REACTIONS: Status: ACTIVE | Noted: 2020-07-24

## 2021-04-28 PROBLEM — T63.441A STING OF HORNETS, WASPS, AND BEES CAUSING POISONING AND TOXIC REACTIONS: Status: ACTIVE | Noted: 2020-07-24

## 2021-04-28 PROBLEM — T63.451A STING OF HORNETS, WASPS, AND BEES CAUSING POISONING AND TOXIC REACTIONS: Status: ACTIVE | Noted: 2020-07-24

## 2021-04-28 PROCEDURE — 99396 PREV VISIT EST AGE 40-64: CPT | Performed by: FAMILY MEDICINE

## 2021-04-28 PROCEDURE — 99213 OFFICE O/P EST LOW 20 MIN: CPT | Performed by: FAMILY MEDICINE

## 2021-04-28 NOTE — PROGRESS NOTES
Chief Complaint  Annual Exam and Depression    Subjective    History of Present Illness        Francisco Javier Chavarria presents to [facility] for   History of Present Illness      Physical exam :   Francisco Javier Chavarria is a 41 y.o. male here for his annual physical with me. Francisco Javier is here for coordination of medical care, to discuss health maintenance, disease prevention as well as to followup on medical problems. Patient has been followed by me for some time now . Patient's last CPE was unknown . Activity level is moderate. Exercises 4 per week. Appetite is fair. Feels Patient states that on a weekly basis he states that he might have two days that he feels like a normal person but he states that the rest of the days are a real struggle. with mental issues and other  complaints. Patient states that he like to describe it as 20 years ago he was a very nomrla person and he states that now he can not even get up and walk to the door without everything in his head starting to get on top of him . He states that the gabapentin really helps him but he states that he always feels he could use more as he states that he wonders if the percentage of time he struggles would be better and help all the mental things he has going on. Energy level is He states that some days are good and some days are poor. Sleeps well.  Patient is doing routine self skin exam monthly.     Lab review:   4/28/2021- The patient is here today and following up on his labs that he had done 4/22/2021. His Vitamin B1 test was canceled as it said that the specimen was not frozen. His CMP overall was normal minus his BUN creatine ratio being 8. His TSH was normal. His vitamin d was 95. His vitamin b-12 was 1,047. His folate was 7.0. His testosterone total was 391 and his testosterone free was 5.9    Stomach Medication:   4/28/2021- The patient states that in the last month he states that the medication he takes for his stomach he  states that he feels when he takes them he feels like he has foam in his mouth and he states that this makes him feel as if he is choking. He states that they are helping so he does not want to stop them but he states that he feels he might have to have something different.     Medication follow up for Anxiety and Depression:   8/19/2019- The patient is here today and following up from his last visit. On his last visit he was started on Celexa 20 mg, Hydroxyzine 25mg and Gabapentin 100 mg to help treat his symptoms. The patient states that he took the medication  For about a week and he states that it just did nothing but make him very tired. He states that it made him irritable due to him being so tired. He states that he has had issues with this before and these types of medication making him so tired. He states that the Gabapentin that he is on helped him with some of his mood but he states he just can not take the medications prescribed for him just for his mood. He states that he took all the Gabapentin but he did not take the other two medications.   9/19/2019- The patient is here today for a follow up on his anxiety and depression. The last time he was here he was stopped on all his medications except his Gabapentin due to side affects he was having taking these medications. He states that the Gabapentin is still working well for him, and he has not had any side affects from taking it. He states that it has helped his moods as well. He states that he ahs his day but he states that overall it has really helped him.   5/11/2020- The patient is calling in today and needs a refill on his Gabapentin. He states that this medication is still working very well for him and he has not had any side affects from taking this medication. Patient seemed very annoyed and I asked him if everything was ok and he just said yes. Patient said the medication is fine and he has no problems with it.   6/8/2020- The patient is here  today and following up on his depression. He states that there has been a lot going on. He states that he has no ambition and he states that he is very confused and he states that he has recently lost his girlfriend and he states that he lost his job. He states that he has not had any thought of self harm. He states that he feels this dose is not working for him anymore and he states that he is on a downward spiral. He states that he does not understand why his girlfriend left him as she has not told him. He states that he lost his job as they closed due to COVID. He states that he has not really wanted to leave his house but he states that he leaves and he states that when he does leave he states that he feels he needs to go back home. He states that his mind will not stop. He states that all he thinks about is his girlfriend but he states that at the same time he doesn't want her. He states that he has applied for several jobs but there is none and no one is calling him back. He states that he went to fill out application the other day and could not even think on how to spell anything.   10/29/2020- the patient is here today and following up on his depression. He is currently on gabapentin to treat his depression and the last time he was here he was started on trintellix and he states that it did nothing for him so he stopped taking it. He states that he has a lot of difficulty taking the other medications and he states that the gabapentin has been the only thing that he has found that works for him. He states that he has not had nay issues taking this medication and he states that he feels it is working well for him but at the same time it is not consistent for him, but he states that it does work at the same time. He states that he wishes there was something that would level him out.   3/19/21: Patient is here today to follow up on his medication. He is on Gabapentin 400 mg TID for this and reports it works well  "for him most of the time. He reports that even some days this doesn't help all the way.   4/28/2021- The patient is here today and he states that the medication is still working about the same for him. He states that some days are better than other and he states that some days the medication really does not help at all.     Depression:  Visit Type: initial  Onset of symptoms: more than 6 months ago  Progression since onset: rapidly worsening  Patient presents with the following symptoms: depressed mood, dizziness, excessive worry, fatigue, feelings of hopelessness, feelings of worthlessness, irritability, nervousness/anxiety, obsessions and weight gain.  Patient is not experiencing: anhedonia, chest pain, choking sensation, compulsions, confusion, decreased concentration, dry mouth, hypersomnia, hyperventilation, impotence, insomnia, malaise, memory impairment, muscle tension, nausea, palpitations, panic, psychomotor agitation, psychomotor retardation, restlessness, shortness of breath, suicidal ideas, suicidal planning, thoughts of death and weight loss.  Frequency of symptoms: constantly   Severity: interfering with daily activities   Aggravated by: family issues and work stress  Sleep quality: good  Nighttime awakenings: several  Risk factors: family history and personality disorder  Patient has a history of: bipolar disorder and depression  No history of: anemia, anxiety/panic attacks, arrhythmia, asthma, CAD, CHF, chronic lung disease, fibromyalgia, hyperthyroidism, suicide attempt, mental illness and substance abuse  Treatment tried: lifestyle changes  Compliance with treatment: good  Past compliance problems: medication issues  Objective   Vital Signs:   Visit Vitals  /84   Pulse 98   Temp 97.1 °F (36.2 °C)   Resp 18   Ht 182.9 cm (72\")   Wt 99.3 kg (219 lb)   SpO2 97%   BMI 29.70 kg/m²       Physical Exam  Vitals reviewed.   Constitutional:       Appearance: He is well-developed.   HENT:      Head: " Normocephalic and atraumatic.      Nose: Nose normal.   Eyes:      General: Lids are normal.      Conjunctiva/sclera: Conjunctivae normal.      Pupils: Pupils are equal, round, and reactive to light.   Cardiovascular:      Rate and Rhythm: Normal rate and regular rhythm.      Pulses: Normal pulses.      Heart sounds: Normal heart sounds.   Pulmonary:      Effort: Pulmonary effort is normal. No respiratory distress.      Breath sounds: Normal breath sounds.   Abdominal:      General: Bowel sounds are normal.      Palpations: Abdomen is soft.   Musculoskeletal:         General: Normal range of motion.      Cervical back: Normal range of motion and neck supple.   Skin:     General: Skin is warm and dry.      Capillary Refill: Capillary refill takes less than 2 seconds.   Neurological:      Mental Status: He is alert and oriented to person, place, and time.   Psychiatric:         Behavior: Behavior normal.         Thought Content: Thought content normal.         Judgment: Judgment normal.            Result Review :                    Assessment and Plan      Diagnoses and all orders for this visit:    1. Physical exam (Primary)  Assessment & Plan:  Discussed injury prevention, diet and exercise, safe sexual practices, and screening for common diseases. Encouraged use of sunscreen and seatbelts. Avoidance of tobacco encouraged. Limitation or avoidance of alcohol encouraged. Recommend yearly dental and eye exams. Also discussed monitoring of blood pressure, lipids.        2. Mild episode of recurrent major depressive disorder (CMS/HCC)  Assessment & Plan:  Patient's depression is recurrent and is moderate without psychosis. Their depression is currently active and the condition is worsening. This will be reassessed in 4 weeks. F/U as described:patient will continue current medication therapy.      3. Testosterone deficiency           Follow Up   No follow-ups on file.  Patient was given instructions and counseling  regarding his condition or for health maintenance advice. Please see specific information pulled into the AVS if appropriate.

## 2021-04-29 PROBLEM — E34.9 TESTOSTERONE DEFICIENCY: Status: ACTIVE | Noted: 2021-04-29

## 2021-04-29 PROBLEM — Z00.00 PHYSICAL EXAM: Status: ACTIVE | Noted: 2021-04-29

## 2021-04-29 NOTE — ASSESSMENT & PLAN NOTE
Patient's depression is recurrent and is moderate without psychosis. Their depression is currently active and the condition is worsening. This will be reassessed in 4 weeks. F/U as described:patient will continue current medication therapy.

## 2021-06-23 ENCOUNTER — OFFICE VISIT (OUTPATIENT)
Dept: PSYCHIATRY | Facility: CLINIC | Age: 41
End: 2021-06-23

## 2021-06-23 DIAGNOSIS — F33.0 MILD EPISODE OF RECURRENT MAJOR DEPRESSIVE DISORDER (HCC): Chronic | ICD-10-CM

## 2021-06-23 DIAGNOSIS — F90.2 ATTENTION DEFICIT HYPERACTIVITY DISORDER, COMBINED TYPE: Primary | Chronic | ICD-10-CM

## 2021-06-23 PROCEDURE — 90792 PSYCH DIAG EVAL W/MED SRVCS: CPT | Performed by: PSYCHIATRY & NEUROLOGY

## 2021-06-23 RX ORDER — DEXTROAMPHETAMINE SACCHARATE, AMPHETAMINE ASPARTATE, DEXTROAMPHETAMINE SULFATE AND AMPHETAMINE SULFATE 2.5; 2.5; 2.5; 2.5 MG/1; MG/1; MG/1; MG/1
10 TABLET ORAL 2 TIMES DAILY
Qty: 60 TABLET | Refills: 0 | Status: SHIPPED | OUTPATIENT
Start: 2021-06-23 | End: 2021-07-20 | Stop reason: SDUPTHER

## 2021-06-23 NOTE — PATIENT INSTRUCTIONS
"http://APA.org/depression-guideline\"> https://clinicalkey.com\"> http://point-of-care.Phonetime.[employer]/skills/\"> http://point-of-care.Phonetime.com\">   Managing Depression, Adult  Depression is a mental health condition that affects your thoughts, feelings, and actions. Being diagnosed with depression can bring you relief if you did not know why you have felt or behaved a certain way. It could also leave you feeling overwhelmed with uncertainty about your future. Preparing yourself to manage your symptoms can help you feel more positive about your future.  How to manage lifestyle changes  Managing stress    Stress is your body's reaction to life changes and events, both good and bad. Stress can add to your feelings of depression. Learning to manage your stress can help lessen your feelings of depression.  Try some of the following approaches to reducing your stress (stress reduction techniques):  · Listen to music that you enjoy and that inspires you.  · Try using a meditation mitul or take a meditation class.  · Develop a practice that helps you connect with your spiritual self. Walk in nature, pray, or go to a place of Zoroastrian.  · Do some deep breathing. To do this, inhale slowly through your nose. Pause at the top of your inhale for a few seconds and then exhale slowly, letting your muscles relax.  · Practice yoga to help relax and work your muscles.  Choose a stress reduction technique that suits your lifestyle and personality. These techniques take time and practice to develop. Set aside 5-15 minutes a day to do them. Therapists can offer training in these techniques. Other things you can do to manage stress include:  · Keeping a stress diary.  · Knowing your limits and saying no when you think something is too much.  · Paying attention to how you react to certain situations. You may not be able to control everything, but you can change your reaction.  · Adding humor to your life by " watching funny films or TV shows.  · Making time for activities that you enjoy and that relax you.    Medicines  Medicines, such as antidepressants, are often a part of treatment for depression.  · Talk with your pharmacist or health care provider about all the medicines, supplements, and herbal products that you take, their possible side effects, and what medicines and other products are safe to take together.  · Make sure to report any side effects you may have to your health care provider.  Relationships  Your health care provider may suggest family therapy, couples therapy, or individual therapy as part of your treatment.  How to recognize changes  Everyone responds differently to treatment for depression. As you recover from depression, you may start to:  · Have more interest in doing activities.  · Feel less hopeless.  · Have more energy.  · Overeat less often, or have a better appetite.  · Have better mental focus.  It is important to recognize if your depression is not getting better or is getting worse. The symptoms you had in the beginning may return, such as:  · Tiredness (fatigue) or low energy.  · Eating too much or too little.  · Sleeping too much or too little.  · Feeling restless, agitated, or hopeless.  · Trouble focusing or making decisions.  · Unexplained physical complaints.  · Feeling irritable, angry, or aggressive.  If you or your family members notice these symptoms coming back, let your health care provider know right away.  Follow these instructions at home:  Activity    · Try to get some form of exercise each day, such as walking, biking, swimming, or lifting weights.  · Practice stress reduction techniques.  · Engage your mind by taking a class or doing some volunteer work.  Lifestyle  · Get the right amount and quality of sleep.  · Cut down on using caffeine, tobacco, alcohol, and other potentially harmful substances.  · Eat a healthy diet that includes plenty of vegetables, fruits,  whole grains, low-fat dairy products, and lean protein. Do not eat a lot of foods that are high in solid fats, added sugars, or salt (sodium).  General instructions  · Take over-the-counter and prescription medicines only as told by your health care provider.  · Keep all follow-up visits as told by your health care provider. This is important.  Where to find support  Talking to others    Friends and family members can be sources of support and guidance. Talk to trusted friends or family members about your condition. Explain your symptoms to them, and let them know that you are working with a health care provider to treat your depression. Tell friends and family members how they also can be helpful.  Finances  · Find appropriate mental health providers that fit with your financial situation.  · Talk with your health care provider about options to get reduced prices on your medicines.  Where to find more information  You can find support in your area from:  · Anxiety and Depression Association of [place] (ADAA): www.adaa.org  · Mental Health [place]: www.mentalhealth[place].net  · National [place] on Mental Illness: www.ravi.org  Contact a health care provider if:  · You stop taking your antidepressant medicines, and you have any of these symptoms:  ? Nausea.  ? Headache.  ? Light-headedness.  ? Chills and body aches.  ? Not being able to sleep (insomnia).  · You or your friends and family think your depression is getting worse.  Get help right away if:  · You have thoughts of hurting yourself or others.  If you ever feel like you may hurt yourself or others, or have thoughts about taking your own life, get help right away. Go to your nearest emergency department or:  · Call your local emergency services (225 in the [place]  · Call a suicide crisis helpline, such as the National Suicide Prevention Lifeline at 1-202.412.6004. This is open 24 hours a day in the [place]  · Text the Crisis Text Line at 392158 (in the  [place]  Summary  · If you are diagnosed with depression, preparing yourself to manage your symptoms is a good way to feel positive about your future.  · Work with your health care provider on a management plan that includes stress reduction techniques, medicines (if applicable), therapy, and healthy lifestyle habits.  · Keep talking with your health care provider about how your treatment is working.  · If you have thoughts about taking your own life, call a suicide crisis helpline or text a crisis text line.  This information is not intended to replace advice given to you by your health care provider. Make sure you discuss any questions you have with your health care provider.  Document Revised: 10/28/2020 Document Reviewed: 10/28/2020  [employer]vier Patient Education © 2021 [employer]

## 2021-06-23 NOTE — PROGRESS NOTES
"Subjective   Francisco Javier Chavarria is a 41 y.o. male who presents today for initial evaluation     Chief Complaint:  \"the biggest thing - unable to concentrate and focus that affects work\"     History of Present Illness: the pt c/o difficulties with concentration since he was at school, at that time, he was day dreaming, did not finish  HS, had trouble finishing tasks, difficult to read, his parents did not pay attention, not supporting meds.  In 11 grade he had issues with attendance, because he could not keep everything under control, was trying to do one step at a time, he was 1:1 with the teacher he was doing better.  He also had troubles with impulsivity , fidgety, had troubles due to behavior   He had the same issues at home with parents, was forgetful     Now he noticed that concentration affects his job, he works as  and he needs help with a lot of things, hard to multitask, can not stay focused on his work , he was trying to avoid activity that require concentration, he frequently takes work to his home to finish without interruptions   Concentration also affects his family life     When the pt is under pressure he is making errors, sometimes even can not spell his name     Sleep - good   Mood - overwhelmed when can not complete his task  Anxiety - increased worries about minor little things, anxiety is associated with increased tension, irritability     Denied sxs of alexa /hypomania       The following portions of the patient's history were reviewed and updated as appropriate: allergies, current medications, past family history, past medical history, past social history, past surgical history and problem list.    PAST PSYCHIATRIC HISTORY  Axis I  No inpt , no SI, no SAs   Axis II  Defer     PAST OUTPATIENT TREATMENT  Diagnosis treated:  Anxiety/Panic Disorder, ADD  Treatment Type:  meds   Prior Psychiatric Medications:  Prozac, zoloft, lexapro - sedation   Support Groups:  None   Sequelae Of Mental " Disorder:  job disruption, social isolation, emotional distress          Interval History  Deteriorated    Side Effects  On no meds       Past Medical History:  Past Medical History:   Diagnosis Date   • Allergic    • Anxiety    • Depression    • Headache        Social History:  Social History     Socioeconomic History   • Marital status: Single     Spouse name: Not on file   • Number of children: Not on file   • Years of education: Not on file   • Highest education level: Not on file   Tobacco Use   • Smoking status: Never Smoker   • Smokeless tobacco: Current User   Substance and Sexual Activity   • Alcohol use: Yes     Alcohol/week: 4.0 - 5.0 standard drinks     Types: 4 - 5 Cans of beer per week     Comment: once per month    • Drug use: No   • Sexual activity: Defer       Family History:  Family History   Problem Relation Age of Onset   • Depression Mother    • Anxiety disorder Mother    • Other Mother    • Mental illness Mother    • Cancer Father    • Depression Sister    • Anxiety disorder Sister    • Other Sister        Past Surgical History:  History reviewed. No pertinent surgical history.    Problem List:  Patient Active Problem List   Diagnosis   • Anxiety   • Depressive disorder   • Alcohol abuse   • Irritable bowel syndrome with diarrhea   • Mild episode of recurrent major depressive disorder (CMS/HCC)   • Right ear pain   • Sting of hornets, wasps, and bees causing poisoning and toxic reactions   • Physical exam   • Testosterone deficiency   • Attention deficit hyperactivity disorder, combined type       Allergy:   Allergies   Allergen Reactions   • Wasp Venom Hives     The patient states last time this happened, he has never been the same since.   • Seasonal Ic [Cholestatin] Itching     Certain things         Discontinued Medications:  There are no discontinued medications.    Current Medications:   Current Outpatient Medications   Medication Sig Dispense Refill   • amphetamine-dextroamphetamine  (Adderall) 10 MG tablet Take 1 tablet by mouth 2 (Two) Times a Day. 60 tablet 0   • dicyclomine (BENTYL) 20 MG tablet Take 1 tablet by mouth Every 6 (Six) Hours. 120 tablet 3   • gabapentin (NEURONTIN) 400 MG capsule Take 1 capsule by mouth 3 (Three) Times a Day. 90 capsule 3     No current facility-administered medications for this visit.         Psychological ROS: positive for - anxiety and concentration difficulties  negative for - hallucinations, hostility, irritability, memory difficulties, mood swings, physical abuse or sexual abuse      Physical Exam:   There were no vitals taken for this visit.    Mental Status Exam:   Hygiene:   good  Cooperation:  Cooperative  Eye Contact:  Good  Psychomotor Behavior:  Appropriate  Affect:  Blunted  Mood: fluctates  Hopelessness: Denies  Speech:  Normal  Thought Process:  Goal directed and Linear  Thought Content:  Mood congruent  Suicidal:  None  Homicidal:  None  Hallucinations:  None  Delusion:  None  Memory:  Intact  Orientation:  Person, Place, Time and Situation  Reliability:  good  Insight:  Good  Judgement:  Fair  Impulse Control:  Fair  Physical/Medical Issues:  No        PHQ-9 Depression Screening  Little interest or pleasure in doing things? 2   Feeling down, depressed, or hopeless? 2   Trouble falling or staying asleep, or sleeping too much? 0   Feeling tired or having little energy? 1   Poor appetite or overeating? 0   Feeling bad about yourself - or that you are a failure or have let yourself or your family down? 3   Trouble concentrating on things, such as reading the newspaper or watching television? 3   Moving or speaking so slowly that other people could have noticed? Or the opposite - being so fidgety or restless that you have been moving around a lot more than usual? 0   Thoughts that you would be better off dead, or of hurting yourself in some way? 0   PHQ-9 Total Score 11   If you checked off any problems, how difficult have these problems made it for  you to do your work, take care of things at home, or get along with other people? Very difficult           Never smoker    I advised Francisco Javier of the risks of tobacco use.     Lab Results:   Clinical Support on 04/22/2021   Component Date Value Ref Range Status   • WBC 04/22/2021 8.1  3.4 - 10.8 x10E3/uL Final   • RBC 04/22/2021 4.79  4.14 - 5.80 x10E6/uL Final   • Hemoglobin 04/22/2021 14.3  13.0 - 17.7 g/dL Final   • Hematocrit 04/22/2021 42.2  37.5 - 51.0 % Final   • MCV 04/22/2021 88  79 - 97 fL Final   • MCH 04/22/2021 29.9  26.6 - 33.0 pg Final   • MCHC 04/22/2021 33.9  31.5 - 35.7 g/dL Final   • RDW 04/22/2021 12.4  11.6 - 15.4 % Final   • Platelets 04/22/2021 473* 150 - 450 x10E3/uL Final   • Neutrophil Rel % 04/22/2021 68  Not Estab. % Final   • Lymphocyte Rel % 04/22/2021 20  Not Estab. % Final   • Monocyte Rel % 04/22/2021 9  Not Estab. % Final   • Eosinophil Rel % 04/22/2021 2  Not Estab. % Final   • Basophil Rel % 04/22/2021 1  Not Estab. % Final   • Neutrophils Absolute 04/22/2021 5.6  1.4 - 7.0 x10E3/uL Final   • Lymphocytes Absolute 04/22/2021 1.6  0.7 - 3.1 x10E3/uL Final   • Monocytes Absolute 04/22/2021 0.7  0.1 - 0.9 x10E3/uL Final   • Eosinophils Absolute 04/22/2021 0.1  0.0 - 0.4 x10E3/uL Final   • Basophils Absolute 04/22/2021 0.1  0.0 - 0.2 x10E3/uL Final   • Immature Granulocyte Rel % 04/22/2021 0  Not Estab. % Final   • Immature Grans Absolute 04/22/2021 0.0  0.0 - 0.1 x10E3/uL Final   • Glucose 04/22/2021 77  65 - 99 mg/dL Final   • BUN 04/22/2021 9  6 - 24 mg/dL Final   • Creatinine 04/22/2021 1.06  0.76 - 1.27 mg/dL Final   • eGFR Non  Am 04/22/2021 87  >59 mL/min/1.73 Final   • eGFR African Am 04/22/2021 101  >59 mL/min/1.73 Final    Comment: **[facility] currently reports eGFR in compliance with the current**    recommendations of the National Kidney Foundation. [facility] will    update reporting as new guidelines are published from the NKF-ASN    Task force.     • BUN/Creatinine Ratio  04/22/2021 8* 9 - 20 Final   • Sodium 04/22/2021 142  134 - 144 mmol/L Final   • Potassium 04/22/2021 4.4  3.5 - 5.2 mmol/L Final   • Chloride 04/22/2021 101  96 - 106 mmol/L Final   • Total CO2 04/22/2021 26  20 - 29 mmol/L Final   • Calcium 04/22/2021 10.1  8.7 - 10.2 mg/dL Final   • Total Protein 04/22/2021 8.0  6.0 - 8.5 g/dL Final   • Albumin 04/22/2021 5.0  4.0 - 5.0 g/dL Final   • Globulin 04/22/2021 3.0  1.5 - 4.5 g/dL Final   • A/G Ratio 04/22/2021 1.7  1.2 - 2.2 Final   • Total Bilirubin 04/22/2021 0.4  0.0 - 1.2 mg/dL Final   • Alkaline Phosphatase 04/22/2021 101  39 - 117 IU/L Final   • AST (SGOT) 04/22/2021 25  0 - 40 IU/L Final   • ALT (SGPT) 04/22/2021 36  0 - 44 IU/L Final   • TSH 04/22/2021 1.210  0.450 - 4.500 uIU/mL Final   • 25 Hydroxy, Vitamin D 04/22/2021 95.0  30.0 - 100.0 ng/mL Final    Comment: Vitamin D deficiency has been defined by the [place] of  [employer] and an Endocrine Society practice guideline as a  level of serum 25-OH vitamin D less than 20 ng/mL (1,2).  The Endocrine Society went on to further define vitamin D  insufficiency as a level between 21 and 29 ng/mL (2).  1. IOM ([place] [employer]). 2010. Dietary reference     intakes for calcium and D. [place]: The     National Academies Press.  2. Elver MF, Lebron DEJESUS, Arnulfo MO, et al.     Evaluation, treatment, and prevention of vitamin D     deficiency: an Endocrine Society clinical practice     guideline. JCEM. 2011 Jul; 96(7):1911-30.     • Vitamin B-12 04/22/2021 1,047  232 - 1,245 pg/mL Final   • Folate 04/22/2021 7.0  >3.0 ng/mL Final    Comment: A serum folate concentration of less than 3.1 ng/mL is  considered to represent clinical deficiency.     • Testosterone, Total 04/22/2021 391  264 - 916 ng/dL Final    Comment: Adult male reference interval is based on a population of  healthy nonobese males (BMI <30) between 19 and 39 years old.  Charline, et.al. JCEM 2017,102;8335-0065. PMID: 58038976.        **Effective May 10, 2021 Testosterone, Serum**         reference interval will be changing to:               Age                Male          Female            0 - 30 days          0 - 650        4 - 190            1 -  5 months        0 - 650        0 -  42                 6 months        0 -  36        0 -  42            7m-  1 year          0 -  36        1 -  26            2 -  5 years         0 -  36        3 -  33            6 -  8 years         0 -  36        3 -  25            9 - 10 years         0 -  21        1 -  33                11 years         1 - 161        5 -  58                12 years         2 - 521        5 -  58           13 - 15 years        28 - 656       12 -  71           16 - 17 years       150 - 785       12 -  71           18 - 19 years       150 - 785       13 -  71                                      20 - 30 years       264 - 916       13 -  71           31 - 40 years       264 - 916        8 -  60           41 - 60 years       264 - 916        4 -  50           61 - 80 years       264 - 916        3 -  67               >80 years       264 - 916        2 -  45     • Testosterone, Free 04/22/2021 5.9* 6.8 - 21.5 pg/mL Final   • Vitamin B1, Whole Blood 04/22/2021 CANCELED  nmol/L Final-Edited    Comment: Test not performed. No frozen whole blood received.    Result canceled by the ancillary.     • Specimen Status 04/22/2021 CANCELED   Final-Edited    Comment: Test not performed. No frozen whole blood received.        TEST:  [id number]  Vitamin B1 (Thiamine), Blood    Result canceled by the ancillary.         Assessment/Plan   Problems Addressed this Visit        Mental Health    Attention deficit hyperactivity disorder, combined type - Primary (Chronic)    Relevant Medications    amphetamine-dextroamphetamine (Adderall) 10 MG tablet    Mild episode of recurrent major depressive disorder (CMS/HCC)    Relevant Medications    amphetamine-dextroamphetamine (Adderall) 10 MG tablet       Diagnoses       Codes Comments    Attention deficit hyperactivity disorder, combined type    -  Primary ICD-10-CM: F90.2  ICD-9-CM: 314.01     Mild episode of recurrent major depressive disorder (CMS/HCC)     ICD-10-CM: F33.0  ICD-9-CM: 296.31           Visit Diagnoses:    ICD-10-CM ICD-9-CM   1. Attention deficit hyperactivity disorder, combined type  F90.2 314.01   2. Mild episode of recurrent major depressive disorder (CMS/HCC)  F33.0 296.31       TREATMENT PLAN/GOALS: Continue supportive psychotherapy efforts and medications as indicated. Treatment and medication options discussed during today's visit. Patient ackowledged and verbally consented to continue with current treatment plan and was educated on the importance of compliance with treatment and follow-up appointments.    MEDICATION ISSUES:  INSPECT reviewed as expected - no gabapentin   1. ADHD  Combined - start adderal 10 mg BID   2. MDD- the pt is taking gabapentin from PCP   PHQ scored 11 and indicated moderate depression     Discussed medication options and treatment plan of prescribed medication as well as the risks, benefits, and side effects including potential falls, possible impaired driving and metabolic adversities among others. Patient is agreeable to call the office with any worsening of symptoms or onset of side effects. Patient is agreeable to call 911 or go to the nearest ER should he/she begin having SI/HI. No medication side effects or related complaints today.     MEDS ORDERED DURING VISIT:  New Medications Ordered This Visit   Medications   • amphetamine-dextroamphetamine (Adderall) 10 MG tablet     Sig: Take 1 tablet by mouth 2 (Two) Times a Day.     Dispense:  60 tablet     Refill:  0       Return in about 1 month (around 7/23/2021).         This document has been electronically signed by Maria De Jesus Quintanilla MD  June 23, 2021 11:58 EDT    EMR Dragon transcription disclaimer:  Some of this encounter note is an electronic transcription  translation of spoken language to printed text. The electronic translation of spoken language may permit erroneous, or at times, nonsensical words or phrases to be inadvertently transcribed; Although I have reviewed the note for such errors some may still exist.

## 2021-07-20 DIAGNOSIS — F90.2 ATTENTION DEFICIT HYPERACTIVITY DISORDER, COMBINED TYPE: Chronic | ICD-10-CM

## 2021-07-20 RX ORDER — DEXTROAMPHETAMINE SACCHARATE, AMPHETAMINE ASPARTATE, DEXTROAMPHETAMINE SULFATE AND AMPHETAMINE SULFATE 2.5; 2.5; 2.5; 2.5 MG/1; MG/1; MG/1; MG/1
10 TABLET ORAL 2 TIMES DAILY
Qty: 60 TABLET | Refills: 0 | Status: SHIPPED | OUTPATIENT
Start: 2021-07-20 | End: 2021-07-28

## 2021-07-28 ENCOUNTER — OFFICE VISIT (OUTPATIENT)
Dept: PSYCHIATRY | Facility: CLINIC | Age: 41
End: 2021-07-28

## 2021-07-28 DIAGNOSIS — F33.0 MILD EPISODE OF RECURRENT MAJOR DEPRESSIVE DISORDER (HCC): Primary | Chronic | ICD-10-CM

## 2021-07-28 DIAGNOSIS — F90.2 ATTENTION DEFICIT HYPERACTIVITY DISORDER, COMBINED TYPE: Chronic | ICD-10-CM

## 2021-07-28 PROBLEM — F32.A DEPRESSIVE DISORDER: Status: RESOLVED | Noted: 2019-07-22 | Resolved: 2021-07-28

## 2021-07-28 PROBLEM — F10.10 ALCOHOL ABUSE: Status: RESOLVED | Noted: 2019-08-19 | Resolved: 2021-07-28

## 2021-07-28 PROCEDURE — 99214 OFFICE O/P EST MOD 30 MIN: CPT | Performed by: PSYCHIATRY & NEUROLOGY

## 2021-07-28 RX ORDER — DEXTROAMPHETAMINE SACCHARATE, AMPHETAMINE ASPARTATE, DEXTROAMPHETAMINE SULFATE AND AMPHETAMINE SULFATE 2.5; 2.5; 2.5; 2.5 MG/1; MG/1; MG/1; MG/1
10 TABLET ORAL 3 TIMES DAILY
Qty: 90 TABLET | Refills: 0 | Status: SHIPPED | OUTPATIENT
Start: 2021-07-28 | End: 2021-09-07 | Stop reason: SDUPTHER

## 2021-07-28 NOTE — PATIENT INSTRUCTIONS
"http://APA.org/depression-guideline\"> https://clinicalkey.com\"> http://point-of-care.L[employer]onix.Corimmun/skills/\"> http://point-of-care.[employer].com\">   Managing Depression, Adult  Depression is a mental health condition that affects your thoughts, feelings, and actions. Being diagnosed with depression can bring you relief if you did not know why you have felt or behaved a certain way. It could also leave you feeling overwhelmed with uncertainty about your future. Preparing yourself to manage your symptoms can help you feel more positive about your future.  How to manage lifestyle changes  Managing stress    Stress is your body's reaction to life changes and events, both good and bad. Stress can add to your feelings of depression. Learning to manage your stress can help lessen your feelings of depression.  Try some of the following approaches to reducing your stress (stress reduction techniques):  · Listen to music that you enjoy and that inspires you.  · Try using a meditation mitul or take a meditation class.  · Develop a practice that helps you connect with your spiritual self. Walk in nature, pray, or go to a place of Evangelical.  · Do some deep breathing. To do this, inhale slowly through your nose. Pause at the top of your inhale for a few seconds and then exhale slowly, letting your muscles relax.  · Practice yoga to help relax and work your muscles.  Choose a stress reduction technique that suits your lifestyle and personality. These techniques take time and practice to develop. Set aside 5-15 minutes a day to do them. Therapists can offer training in these techniques. Other things you can do to manage stress include:  · Keeping a stress diary.  · Knowing your limits and saying no when you think something is too much.  · Paying attention to how you react to certain situations. You may not be able to control everything, but you can change your reaction.  · Adding humor to your life by " watching funny films or TV shows.  · Making time for activities that you enjoy and that relax you.    Medicines  Medicines, such as antidepressants, are often a part of treatment for depression.  · Talk with your pharmacist or health care provider about all the medicines, supplements, and herbal products that you take, their possible side effects, and what medicines and other products are safe to take together.  · Make sure to report any side effects you may have to your health care provider.  Relationships  Your health care provider may suggest family therapy, couples therapy, or individual therapy as part of your treatment.  How to recognize changes  Everyone responds differently to treatment for depression. As you recover from depression, you may start to:  · Have more interest in doing activities.  · Feel less hopeless.  · Have more energy.  · Overeat less often, or have a better appetite.  · Have better mental focus.  It is important to recognize if your depression is not getting better or is getting worse. The symptoms you had in the beginning may return, such as:  · Tiredness (fatigue) or low energy.  · Eating too much or too little.  · Sleeping too much or too little.  · Feeling restless, agitated, or hopeless.  · Trouble focusing or making decisions.  · Unexplained physical complaints.  · Feeling irritable, angry, or aggressive.  If you or your family members notice these symptoms coming back, let your health care provider know right away.  Follow these instructions at home:  Activity    · Try to get some form of exercise each day, such as walking, biking, swimming, or lifting weights.  · Practice stress reduction techniques.  · Engage your mind by taking a class or doing some volunteer work.  Lifestyle  · Get the right amount and quality of sleep.  · Cut down on using caffeine, tobacco, alcohol, and other potentially harmful substances.  · Eat a healthy diet that includes plenty of vegetables, fruits,  whole grains, low-fat dairy products, and lean protein. Do not eat a lot of foods that are high in solid fats, added sugars, or salt (sodium).  General instructions  · Take over-the-counter and prescription medicines only as told by your health care provider.  · Keep all follow-up visits as told by your health care provider. This is important.  Where to find support  Talking to others    Friends and family members can be sources of support and guidance. Talk to trusted friends or family members about your condition. Explain your symptoms to them, and let them know that you are working with a health care provider to treat your depression. Tell friends and family members how they also can be helpful.  Finances  · Find appropriate mental health providers that fit with your financial situation.  · Talk with your health care provider about options to get reduced prices on your medicines.  Where to find more information  You can find support in your area from:  · Anxiety and Depression Association of [place] (ADAA): www.adaa.org  · Mental Health [place]: www.mentalhealth[place].net  · National [place] on Mental Illness: www.ravi.org  Contact a health care provider if:  · You stop taking your antidepressant medicines, and you have any of these symptoms:  ? Nausea.  ? Headache.  ? Light-headedness.  ? Chills and body aches.  ? Not being able to sleep (insomnia).  · You or your friends and family think your depression is getting worse.  Get help right away if:  · You have thoughts of hurting yourself or others.  If you ever feel like you may hurt yourself or others, or have thoughts about taking your own life, get help right away. Go to your nearest emergency department or:  · Call your local emergency services (953 in the [place]  · Call a suicide crisis helpline, such as the National Suicide Prevention Lifeline at 1-169.832.4892. This is open 24 hours a day in the [place]  · Text the Crisis Text Line at 921261 (in the  [place]  Summary  · If you are diagnosed with depression, preparing yourself to manage your symptoms is a good way to feel positive about your future.  · Work with your health care provider on a management plan that includes stress reduction techniques, medicines (if applicable), therapy, and healthy lifestyle habits.  · Keep talking with your health care provider about how your treatment is working.  · If you have thoughts about taking your own life, call a suicide crisis helpline or text a crisis text line.  This information is not intended to replace advice given to you by your health care provider. Make sure you discuss any questions you have with your health care provider.  Document Revised: 10/28/2020 Document Reviewed: 10/28/2020  Elsevier Patient Education © 2021 [employer].

## 2021-07-28 NOTE — PROGRESS NOTES
Subjective   Francisco Javier Chavarria is a 41 y.o. male who presents today for follow up    Chief Complaint:   Decreased concentration      History of Present Illness: the pt c/o difficulties with concentration since he was at school, at that time, he was day dreaming, did not finish  HS, had trouble finishing tasks, difficult to read, his parents did not pay attention, not supporting meds.  In 11 grade he had issues with attendance, because he could not keep everything under control, was trying to do one step at a time, he was 1:1 with the teacher he was doing better.  He also had troubles with impulsivity , fidgety, had troubles due to behavior   He had the same issues at home with parents, was forgetful     Now he noticed that concentration affects his job, he works as  and he needs help with a lot of things, hard to multitask, can not stay focused on his work , he was trying to avoid activity that require concentration, he frequently takes work to his home to finish without interruptions   Concentration also affects his family life     When the pt is under pressure he is making errors, sometimes even can not spell his name     Sleep - good   Mood - overwhelmed when can not complete his task  Anxiety - increased worries about minor little things, anxiety is associated with increased tension, irritability     Denied sxs of alexa /hypomania     Today the pt reported feeling better, adderall is effective and helps to stay focused and make less errors , but it does not last long, the pt works long shifts         The following portions of the patient's history were reviewed and updated as appropriate: allergies, current medications, past family history, past medical history, past social history, past surgical history and problem list.    PAST PSYCHIATRIC HISTORY  Axis I  No inpt , no SI, no SAs   Axis II  Defer     PAST OUTPATIENT TREATMENT  Diagnosis treated:  Anxiety/Panic Disorder, ADD  Treatment Type:  meds    Prior Psychiatric Medications:  Prozac, zoloft, lexapro - sedation   Support Groups:  None   Sequelae Of Mental Disorder:  job disruption, social isolation, emotional distress          Interval History  Some improvement     Side Effects  Denied        Past Medical History:  Past Medical History:   Diagnosis Date   • Allergic    • Anxiety    • Depression    • Headache        Social History:  Social History     Socioeconomic History   • Marital status: Single     Spouse name: Not on file   • Number of children: Not on file   • Years of education: Not on file   • Highest education level: Not on file   Tobacco Use   • Smoking status: Never Smoker   • Smokeless tobacco: Current User   Substance and Sexual Activity   • Alcohol use: Yes     Alcohol/week: 4.0 - 5.0 standard drinks     Types: 4 - 5 Cans of beer per week     Comment: once per month    • Drug use: No   • Sexual activity: Defer       Family History:  Family History   Problem Relation Age of Onset   • Depression Mother    • Anxiety disorder Mother    • Other Mother    • Mental illness Mother    • Cancer Father    • Depression Sister    • Anxiety disorder Sister    • Other Sister        Past Surgical History:  History reviewed. No pertinent surgical history.    Problem List:  Patient Active Problem List   Diagnosis   • Irritable bowel syndrome with diarrhea   • Mild episode of recurrent major depressive disorder (CMS/HCC)   • Right ear pain   • Sting of hornets, wasps, and bees causing poisoning and toxic reactions   • Physical exam   • Testosterone deficiency   • Attention deficit hyperactivity disorder, combined type       Allergy:   Allergies   Allergen Reactions   • Wasp Venom Hives     The patient states last time this happened, he has never been the same since.   • Seasonal Ic [Cholestatin] Itching     Certain things         Discontinued Medications:  Medications Discontinued During This Encounter   Medication Reason   • amphetamine-dextroamphetamine  (Adderall) 10 MG tablet        Current Medications:   Current Outpatient Medications   Medication Sig Dispense Refill   • amphetamine-dextroamphetamine (Adderall) 10 MG tablet Take 1 tablet by mouth 3 (Three) Times a Day. 90 tablet 0   • dicyclomine (BENTYL) 20 MG tablet Take 1 tablet by mouth Every 6 (Six) Hours. 120 tablet 3   • gabapentin (NEURONTIN) 400 MG capsule Take 1 capsule by mouth 3 (Three) Times a Day. 90 capsule 3     No current facility-administered medications for this visit.         Psychological ROS: positive for - anxiety and concentration difficulties  negative for - hallucinations, hostility, irritability, memory difficulties, mood swings, physical abuse or sexual abuse      Physical Exam:   There were no vitals taken for this visit.    Mental Status Exam:   Hygiene:   good  Cooperation:  Cooperative  Eye Contact:  Good  Psychomotor Behavior:  Appropriate  Affect:  Appropriate  Mood: fluctates  Hopelessness: Denies  Speech:  Normal  Thought Process:  Goal directed and Linear  Thought Content:  Mood congruent  Suicidal:  None  Homicidal:  None  Hallucinations:  None  Delusion:  None  Memory:  Intact  Orientation:  Person, Place, Time and Situation  Reliability:  good  Insight:  Good  Judgement:  Fair  Impulse Control:  Fair  Physical/Medical Issues:  No    MSE from 6/23/2021 reviewed and accepted with changes     PHQ-9 Depression Screening  Little interest or pleasure in doing things? 1   Feeling down, depressed, or hopeless? 1   Trouble falling or staying asleep, or sleeping too much? 0   Feeling tired or having little energy? 1   Poor appetite or overeating? 0   Feeling bad about yourself - or that you are a failure or have let yourself or your family down? 1   Trouble concentrating on things, such as reading the newspaper or watching television? 2   Moving or speaking so slowly that other people could have noticed? Or the opposite - being so fidgety or restless that you have been moving around  a lot more than usual? 0   Thoughts that you would be better off dead, or of hurting yourself in some way? 0   PHQ-9 Total Score 6   If you checked off any problems, how difficult have these problems made it for you to do your work, take care of things at home, or get along with other people? Somewhat difficult           Never smoker    I advised Francisco Javier of the risks of tobacco use.     Lab Results:   No visits with results within 3 Month(s) from this visit.   Latest known visit with results is:   Clinical Support on 04/22/2021   Component Date Value Ref Range Status   • WBC 04/22/2021 8.1  3.4 - 10.8 x10E3/uL Final   • RBC 04/22/2021 4.79  4.14 - 5.80 x10E6/uL Final   • Hemoglobin 04/22/2021 14.3  13.0 - 17.7 g/dL Final   • Hematocrit 04/22/2021 42.2  37.5 - 51.0 % Final   • MCV 04/22/2021 88  79 - 97 fL Final   • MCH 04/22/2021 29.9  26.6 - 33.0 pg Final   • MCHC 04/22/2021 33.9  31.5 - 35.7 g/dL Final   • RDW 04/22/2021 12.4  11.6 - 15.4 % Final   • Platelets 04/22/2021 473* 150 - 450 x10E3/uL Final   • Neutrophil Rel % 04/22/2021 68  Not Estab. % Final   • Lymphocyte Rel % 04/22/2021 20  Not Estab. % Final   • Monocyte Rel % 04/22/2021 9  Not Estab. % Final   • Eosinophil Rel % 04/22/2021 2  Not Estab. % Final   • Basophil Rel % 04/22/2021 1  Not Estab. % Final   • Neutrophils Absolute 04/22/2021 5.6  1.4 - 7.0 x10E3/uL Final   • Lymphocytes Absolute 04/22/2021 1.6  0.7 - 3.1 x10E3/uL Final   • Monocytes Absolute 04/22/2021 0.7  0.1 - 0.9 x10E3/uL Final   • Eosinophils Absolute 04/22/2021 0.1  0.0 - 0.4 x10E3/uL Final   • Basophils Absolute 04/22/2021 0.1  0.0 - 0.2 x10E3/uL Final   • Immature Granulocyte Rel % 04/22/2021 0  Not Estab. % Final   • Immature Grans Absolute 04/22/2021 0.0  0.0 - 0.1 x10E3/uL Final   • Glucose 04/22/2021 77  65 - 99 mg/dL Final   • BUN 04/22/2021 9  6 - 24 mg/dL Final   • Creatinine 04/22/2021 1.06  0.76 - 1.27 mg/dL Final   • eGFR Non  Am 04/22/2021 87  >59 mL/min/1.73 Final    • eGFR  Am 04/22/2021 101  >59 mL/min/1.73 Final    Comment: **[facility] currently reports eGFR in compliance with the current**    recommendations of the National Kidney Foundation. [facility] will    update reporting as new guidelines are published from the NKF-ASN    Task force.     • BUN/Creatinine Ratio 04/22/2021 8* 9 - 20 Final   • Sodium 04/22/2021 142  134 - 144 mmol/L Final   • Potassium 04/22/2021 4.4  3.5 - 5.2 mmol/L Final   • Chloride 04/22/2021 101  96 - 106 mmol/L Final   • Total CO2 04/22/2021 26  20 - 29 mmol/L Final   • Calcium 04/22/2021 10.1  8.7 - 10.2 mg/dL Final   • Total Protein 04/22/2021 8.0  6.0 - 8.5 g/dL Final   • Albumin 04/22/2021 5.0  4.0 - 5.0 g/dL Final   • Globulin 04/22/2021 3.0  1.5 - 4.5 g/dL Final   • A/G Ratio 04/22/2021 1.7  1.2 - 2.2 Final   • Total Bilirubin 04/22/2021 0.4  0.0 - 1.2 mg/dL Final   • Alkaline Phosphatase 04/22/2021 101  39 - 117 IU/L Final   • AST (SGOT) 04/22/2021 25  0 - 40 IU/L Final   • ALT (SGPT) 04/22/2021 36  0 - 44 IU/L Final   • TSH 04/22/2021 1.210  0.450 - 4.500 uIU/mL Final   • 25 Hydroxy, Vitamin D 04/22/2021 95.0  30.0 - 100.0 ng/mL Final    Comment: Vitamin D deficiency has been defined by the [place] of  Med[employer] and an Endocrine Society practice guideline as a  level of serum 25-OH vitamin D less than 20 ng/mL (1,2).  The Endocrine Society went on to further define vitamin D  insufficiency as a level between 21 and 29 ng/mL (2).  1. IOM ([place] of [employer]). 2010. Dietary reference     intakes for calcium and D. [place]: The     National Academies Press.  2. Elver MF, Lebron DEJESUS, Luis AlfredoRosangela MO et al.     Evaluation, treatment, and prevention of vitamin D     deficiency: an Endocrine Society clinical practice     guideline. JCEM. 2011 Jul; 96(7):1911-30.     • Vitamin B-12 04/22/2021 1,047  232 - 1,245 pg/mL Final   • Folate 04/22/2021 7.0  >3.0 ng/mL Final    Comment: A serum folate concentration of less than 3.1  ng/mL is  considered to represent clinical deficiency.     • Testosterone, Total 04/22/2021 391  264 - 916 ng/dL Final    Comment: Adult male reference interval is based on a population of  healthy nonobese males (BMI <30) between 19 and 39 years old.  Charline et.al. JCEM 2017,102;8329-6447. PMID: 99049299.       **Effective May 10, 2021 Testosterone, Serum**         reference interval will be changing to:               Age                Male          Female            0 - 30 days          0 - 650        4 - 190            1 -  5 months        0 - 650        0 -  42                 6 months        0 -  36        0 -  42            7m-  1 year          0 -  36        1 -  26            2 -  5 years         0 -  36        3 -  33            6 -  8 years         0 -  36        3 -  25            9 - 10 years         0 -  21        1 -  33                11 years         1 - 161        5 -  58                12 years         2 - 521        5 -  58           13 - 15 years        28 - 656       12 -  71           16 - 17 years       150 - 785       12 -  71           18 - 19 years       150 - 785       13 -  71                                      20 - 30 years       264 - 916       13 -  71           31 - 40 years       264 - 916        8 -  60           41 - 60 years       264 - 916        4 -  50           61 - 80 years       264 - 916        3 -  67               >80 years       264 - 916        2 -  45     • Testosterone, Free 04/22/2021 5.9* 6.8 - 21.5 pg/mL Final   • Vitamin B1, Whole Blood 04/22/2021 CANCELED  nmol/L Final-Edited    Comment: Test not performed. No frozen whole blood received.    Result canceled by the ancillary.     • Specimen Status 04/22/2021 CANCELED   Final-Edited    Comment: Test not performed. No frozen whole blood received.        TEST:  [id number]  Vitamin B1 (Thiamine), Blood    Result canceled by the ancillary.         Assessment/Plan   Problems Addressed this Visit        Mental Health     Mild episode of recurrent major depressive disorder (CMS/HCC) - Primary (Chronic)    Relevant Medications    amphetamine-dextroamphetamine (Adderall) 10 MG tablet    Attention deficit hyperactivity disorder, combined type (Chronic)    Relevant Medications    amphetamine-dextroamphetamine (Adderall) 10 MG tablet      Diagnoses       Codes Comments    Mild episode of recurrent major depressive disorder (CMS/HCC)    -  Primary ICD-10-CM: F33.0  ICD-9-CM: 296.31     Attention deficit hyperactivity disorder, combined type     ICD-10-CM: F90.2  ICD-9-CM: 314.01           Visit Diagnoses:    ICD-10-CM ICD-9-CM   1. Mild episode of recurrent major depressive disorder (CMS/HCC)  F33.0 296.31   2. Attention deficit hyperactivity disorder, combined type  F90.2 314.01       TREATMENT PLAN/GOALS: Continue supportive psychotherapy efforts and medications as indicated. Treatment and medication options discussed during today's visit. Patient ackowledged and verbally consented to continue with current treatment plan and was educated on the importance of compliance with treatment and follow-up appointments.    MEDICATION ISSUES:  INSPECT reviewed as expected - 7/20/2021 adderall # 60     1. ADHD  Combined - cont  adderal 10 mg, change to TID  To provide longer coverage   Consider 20 mg BID or change to Adderall XR or Mydayis      2. MDD- the pt is taking gabapentin from PCP   PHQ scored 6  and indicated mild  depression     Discussed medication options and treatment plan of prescribed medication as well as the risks, benefits, and side effects including potential falls, possible impaired driving and metabolic adversities among others. Patient is agreeable to call the office with any worsening of symptoms or onset of side effects. Patient is agreeable to call 911 or go to the nearest ER should he/she begin having SI/HI. No medication side effects or related complaints today.     MEDS ORDERED DURING VISIT:  New Medications Ordered  This Visit   Medications   • amphetamine-dextroamphetamine (Adderall) 10 MG tablet     Sig: Take 1 tablet by mouth 3 (Three) Times a Day.     Dispense:  90 tablet     Refill:  0     Please dispense on or after August 7, 2021, the dose was increased to 10 mg TID on 7/28/2021       Return in about 3 months (around 10/28/2021).         This document has been electronically signed by Maria De Jesus Quintanilla MD  July 28, 2021 12:12 EDT    EMR Dragon transcription disclaimer:  Some of this encounter note is an electronic transcription translation of spoken language to printed text. The electronic translation of spoken language may permit erroneous, or at times, nonsensical words or phrases to be inadvertently transcribed; Although I have reviewed the note for such errors some may still exist.

## 2021-08-27 DIAGNOSIS — F33.0 MILD EPISODE OF RECURRENT MAJOR DEPRESSIVE DISORDER (HCC): ICD-10-CM

## 2021-08-27 RX ORDER — GABAPENTIN 400 MG/1
CAPSULE ORAL
Qty: 90 CAPSULE | Refills: 0 | Status: SHIPPED | OUTPATIENT
Start: 2021-08-27 | End: 2021-10-07

## 2021-09-07 DIAGNOSIS — F90.2 ATTENTION DEFICIT HYPERACTIVITY DISORDER, COMBINED TYPE: Chronic | ICD-10-CM

## 2021-09-07 NOTE — TELEPHONE ENCOUNTER
Rx Refill Note  Requested Prescriptions     Pending Prescriptions Disp Refills   • amphetamine-dextroamphetamine (Adderall) 10 MG tablet 90 tablet 0     Sig: Take 1 tablet by mouth 3 (Three) Times a Day.      Last office visit with prescribing clinician: 7/28/2021      Next office visit with prescribing clinician: 10/29/2021            Gricelda Gaona MA  09/07/21, 16:29 EDT

## 2021-09-08 RX ORDER — DEXTROAMPHETAMINE SACCHARATE, AMPHETAMINE ASPARTATE, DEXTROAMPHETAMINE SULFATE AND AMPHETAMINE SULFATE 2.5; 2.5; 2.5; 2.5 MG/1; MG/1; MG/1; MG/1
10 TABLET ORAL 3 TIMES DAILY
Qty: 90 TABLET | Refills: 0 | Status: SHIPPED | OUTPATIENT
Start: 2021-09-08 | End: 2021-10-06 | Stop reason: SDUPTHER

## 2021-10-06 DIAGNOSIS — F90.2 ATTENTION DEFICIT HYPERACTIVITY DISORDER, COMBINED TYPE: Chronic | ICD-10-CM

## 2021-10-06 NOTE — TELEPHONE ENCOUNTER
Rx Refill Note  Requested Prescriptions     Pending Prescriptions Disp Refills   • amphetamine-dextroamphetamine (Adderall) 10 MG tablet 90 tablet 0     Sig: Take 1 tablet by mouth 3 (Three) Times a Day.      Last office visit with prescribing clinician: 7/28/2021      Next office visit with prescribing clinician: 10/29/2021     Office Visit with Maria De Jesus Quintanilla MD (06/23/2021)         Teresa Corcoran MA  10/06/21, 15:22 EDT     Inspect printed

## 2021-10-07 DIAGNOSIS — F33.0 MILD EPISODE OF RECURRENT MAJOR DEPRESSIVE DISORDER (HCC): ICD-10-CM

## 2021-10-07 RX ORDER — GABAPENTIN 400 MG/1
CAPSULE ORAL
Qty: 90 CAPSULE | Refills: 0 | Status: SHIPPED | OUTPATIENT
Start: 2021-10-07 | End: 2021-12-23

## 2021-10-07 RX ORDER — DEXTROAMPHETAMINE SACCHARATE, AMPHETAMINE ASPARTATE, DEXTROAMPHETAMINE SULFATE AND AMPHETAMINE SULFATE 2.5; 2.5; 2.5; 2.5 MG/1; MG/1; MG/1; MG/1
10 TABLET ORAL 3 TIMES DAILY
Qty: 90 TABLET | Refills: 0 | Status: SHIPPED | OUTPATIENT
Start: 2021-10-07 | End: 2021-10-29 | Stop reason: DRUGHIGH

## 2021-10-29 ENCOUNTER — OFFICE VISIT (OUTPATIENT)
Dept: PSYCHIATRY | Facility: CLINIC | Age: 41
End: 2021-10-29

## 2021-10-29 DIAGNOSIS — F33.0 MILD EPISODE OF RECURRENT MAJOR DEPRESSIVE DISORDER (HCC): Primary | Chronic | ICD-10-CM

## 2021-10-29 DIAGNOSIS — F90.2 ATTENTION DEFICIT HYPERACTIVITY DISORDER, COMBINED TYPE: Chronic | ICD-10-CM

## 2021-10-29 PROCEDURE — 99213 OFFICE O/P EST LOW 20 MIN: CPT | Performed by: PSYCHIATRY & NEUROLOGY

## 2021-10-29 RX ORDER — DEXTROAMPHETAMINE SACCHARATE, AMPHETAMINE ASPARTATE, DEXTROAMPHETAMINE SULFATE AND AMPHETAMINE SULFATE 5; 5; 5; 5 MG/1; MG/1; MG/1; MG/1
20 TABLET ORAL 3 TIMES DAILY
Qty: 90 TABLET | Refills: 0 | Status: SHIPPED | OUTPATIENT
Start: 2021-10-29 | End: 2021-11-29 | Stop reason: SDUPTHER

## 2021-10-29 NOTE — PATIENT INSTRUCTIONS
Attention Deficit Hyperactivity Disorder, Adult  Attention deficit hyperactivity disorder (ADHD) is a mental health disorder that starts during childhood (neurodevelopmental disorder). For many people with ADHD, the disorder continues into the adult years. Treatment can help you manage your symptoms.  What are the causes?  The exact cause of ADHD is not known. Most experts believe genetics and environmental factors contribute to ADHD.  What increases the risk?  The following factors may make you more likely to develop this condition:  · Having a family history of ADHD.  · Being male.  · Being born to a mother who smoked or drank alcohol during pregnancy.  · Being exposed to lead or other toxins in the womb or early in life.  · Being born before 37 weeks of pregnancy (prematurely) or at a low birth weight.  · Having experienced a brain injury.  What are the signs or symptoms?  Symptoms of this condition depend on the type of ADHD. The two main types are inattentive and hyperactive-impulsive. Some people may have symptoms of both types.  Symptoms of the inattentive type include:  · Difficulty paying attention.  · Making careless mistakes.  · Not following instructions.  · Being disorganized.  · Avoiding tasks that require time and attention.  · Losing and forgetting things.  · Being easily distracted.  Symptoms of the hyperactive-impulsive type include:  · Restlessness.  · Talking too much.  · Interrupting.  · Difficulty with:  ? Sitting still.  ? Feeling motivated.  ? Relaxing.  ? Waiting in line or waiting for a turn.  In adults, this condition may lead to certain problems, such as:  · Keeping jobs.  · Performing tasks at work.  · Having stable relationships.  · Being on time or keeping to a schedule.  How is this diagnosed?  This condition is diagnosed based on your current symptoms and your history of symptoms. The diagnosis can be made by a health care provider such as a primary care provider or a mental health  care specialist.  Your health care provider may use a symptom checklist or a behavior rating scale to evaluate your symptoms. He or she may also want to talk with people who have observed your behaviors throughout your life.  How is this treated?  This condition can be treated with medicines and behavior therapy. Medicines may be the best option to reduce impulsive behaviors and improve attention. Your health care provider may recommend:  · Stimulant medicines. These are the most common medicines used for adult ADHD. They affect certain chemicals in the brain (neurotransmitters) and improve your ability to control your symptoms.  · A non-stimulant medicine for adult ADHD (atomoxetine). This medicine increases a neurotransmitter called norepinephrine. It may take weeks to months to see effects from this medicine.  Counseling and behavioral management are also important for treating ADHD. Counseling is often used along with medicine. Your health care provider may suggest:  · Cognitive behavioral therapy (CBT). This type of therapy teaches you to replace negative thoughts and actions with positive thoughts and actions. When used as part of ADHD treatment, this therapy may also include:  ? Coping strategies for organization, time management, impulse control, and stress reduction.  ? Mindfulness and meditation training.  · Behavioral management. You may work with a  who is specially trained to help people with ADHD manage and organize activities and function more effectively.  Follow these instructions at home:  Medicines    · Take over-the-counter and prescription medicines only as told by your health care provider.  · Talk with your health care provider about the possible side effects of your medicines and how to manage them.    Lifestyle    · Do not use drugs.  · Do not drink alcohol if:  ? Your health care provider tells you not to drink.  ? You are pregnant, may be pregnant, or are planning to become  pregnant.  · If you drink alcohol:  ? Limit how much you use to:  § 0-1 drink a day for women.  § 0-2 drinks a day for men.  ? Be aware of how much alcohol is in your drink. In the [place] one drink equals one 12 oz bottle of beer (355 mL), one 5 oz glass of wine (148 mL), or one 1½ oz glass of hard liquor (44 mL).  · Get enough sleep.  · Eat a healthy diet.  · Exercise regularly. Exercise can help to reduce stress and anxiety.    General instructions  · Learn as much as you can about adult ADHD, and work closely with your health care providers to find the treatments that work best for you.  · Follow the same schedule each day.  · Use reminder devices like notes, calendars, and phone apps to stay on time and organized.  · Keep all follow-up visits as told by your health care provider and therapist. This is important.  Where to find more information  A health care provider may be able to recommend resources that are available online or over the phone. You could start with:  · Attention Deficit Disorder Association (ADDA): www.add.org  · National [place] of Mental Health (NIM): www.nimh.nih.gov  Contact a health care provider if:  · Your symptoms continue to cause problems.  · You have side effects from your medicine, such as:  ? Repeated muscle twitches, coughing, or speech outbursts.  ? Sleep problems.  ? Loss of appetite.  ? Dizziness.  ? Unusually fast heartbeat.  ? Stomach pains.  ? Headaches.  · You are struggling with anxiety, depression, or substance abuse.  Get help right away if you:  · Have a severe reaction to a medicine.  If you ever feel like you may hurt yourself or others, or have thoughts about taking your own life, get help right away. You can go to the nearest emergency department or call:  · Your local emergency services (911 in the [place]  · A suicide crisis helpline, such as the National Suicide Prevention Lifeline at 1-830.973.6656. This is open 24 hours a day.  Summary  · ADHD is a mental  health disorder that starts during childhood (neurodevelopmental disorder) and often continues into the adult years.  · The exact cause of ADHD is not known. Most experts believe genetics and environmental factors contribute to ADHD.  · There is no cure for ADHD, but treatment with medicine, cognitive behavioral therapy, or behavioral management can help you manage your condition.  This information is not intended to replace advice given to you by your health care provider. Make sure you discuss any questions you have with your health care provider.  Document Revised: 05/11/2020 Document Reviewed: 05/11/2020  [employer] Patient Education © 2021 [employer]

## 2021-10-29 NOTE — PROGRESS NOTES
Subjective   Francisco Javier Chavarria is a 41 y.o. male who presents today for follow up    Chief Complaint:   Decreased concentration      History of Present Illness: the pt c/o difficulties with concentration since he was at school, at that time, he was day dreaming, did not finish  HS, had trouble finishing tasks, difficult to read, his parents did not pay attention, not supporting meds.  In 11 grade he had issues with attendance, because he could not keep everything under control, was trying to do one step at a time, he was 1:1 with the teacher he was doing better.  He also had troubles with impulsivity , fidgety, had troubles due to behavior   He had the same issues at home with parents, was forgetful     Now he noticed that concentration affects his job, he works as  and he needs help with a lot of things, hard to multitask, can not stay focused on his work , he was trying to avoid activity that require concentration, he frequently takes work to his home to finish without interruptions   Concentration also affects his family life     When the pt is under pressure he is making errors, sometimes even can not spell his name     Sleep - good   Mood - overwhelmed when can not complete his task  Anxiety - increased worries about minor little things, anxiety is associated with increased tension, irritability     Denied sxs of alexa /hypomania     Today the pt reported feeling better, adderall was effective at the beginning , but lost potency now, getting distracted, scattered     the pt works long shifts         The following portions of the patient's history were reviewed and updated as appropriate: allergies, current medications, past family history, past medical history, past social history, past surgical history and problem list.    PAST PSYCHIATRIC HISTORY  Axis I  No inpt , no SI, no SAs   Axis II  Defer     PAST OUTPATIENT TREATMENT  Diagnosis treated:  Anxiety/Panic Disorder, ADD  Treatment Type:  meds    Prior Psychiatric Medications:  Prozac, zoloft, lexapro - sedation   Support Groups:  None   Sequelae Of Mental Disorder:  job disruption, social isolation, emotional distress          Interval History  No changes     Side Effects  Denied        Past Medical History:  Past Medical History:   Diagnosis Date   • Allergic    • Anxiety    • Depression    • Headache        Social History:  Social History     Socioeconomic History   • Marital status: Single   Tobacco Use   • Smoking status: Never Smoker   • Smokeless tobacco: Current User   Substance and Sexual Activity   • Alcohol use: Yes     Alcohol/week: 4.0 - 5.0 standard drinks     Types: 4 - 5 Cans of beer per week     Comment: once per month    • Drug use: No   • Sexual activity: Defer       Family History:  Family History   Problem Relation Age of Onset   • Depression Mother    • Anxiety disorder Mother    • Other Mother    • Mental illness Mother    • Cancer Father    • Depression Sister    • Anxiety disorder Sister    • Other Sister        Past Surgical History:  History reviewed. No pertinent surgical history.    Problem List:  Patient Active Problem List   Diagnosis   • Irritable bowel syndrome with diarrhea   • Mild episode of recurrent major depressive disorder (HCC)   • Right ear pain   • Sting of hornets, wasps, and bees causing poisoning and toxic reactions   • Physical exam   • Testosterone deficiency   • Attention deficit hyperactivity disorder, combined type       Allergy:   Allergies   Allergen Reactions   • Wasp Venom Hives     The patient states last time this happened, he has never been the same since.   • Seasonal Ic [Cholestatin] Itching     Certain things         Discontinued Medications:  Medications Discontinued During This Encounter   Medication Reason   • amphetamine-dextroamphetamine (Adderall) 10 MG tablet Dose adjustment       Current Medications:   Current Outpatient Medications   Medication Sig Dispense Refill   •  amphetamine-dextroamphetamine (Adderall) 20 MG tablet Take 1 tablet by mouth 3 (Three) Times a Day. 90 tablet 0   • dicyclomine (BENTYL) 20 MG tablet Take 1 tablet by mouth Every 6 (Six) Hours. 120 tablet 3   • gabapentin (NEURONTIN) 400 MG capsule TAKE 1 CAPSULE BY MOUTH THREE TIMES DAILY 90 capsule 0     No current facility-administered medications for this visit.         Psychological ROS: positive for - anxiety and concentration difficulties  negative for - hallucinations, hostility, irritability, memory difficulties, mood swings, physical abuse or sexual abuse      Physical Exam:   There were no vitals taken for this visit.    Mental Status Exam:   Hygiene:   good  Cooperation:  Cooperative  Eye Contact:  Good  Psychomotor Behavior:  Appropriate  Affect:  Appropriate  Mood: fluctates  Hopelessness: Denies  Speech:  Normal  Thought Process:  Goal directed and Linear  Thought Content:  Mood congruent  Suicidal:  None  Homicidal:  None  Hallucinations:  None  Delusion:  None  Memory:  Intact  Orientation:  Person, Place, Time and Situation  Reliability:  good  Insight:  Good  Judgement:  Fair  Impulse Control:  Fair  Physical/Medical Issues:  No    MSE from 7/28/2021 reviewed and no  changes necessary     PHQ-9 Depression Screening  Little interest or pleasure in doing things? 1   Feeling down, depressed, or hopeless? 1   Trouble falling or staying asleep, or sleeping too much? 0   Feeling tired or having little energy? 1   Poor appetite or overeating? 0   Feeling bad about yourself - or that you are a failure or have let yourself or your family down? 1   Trouble concentrating on things, such as reading the newspaper or watching television? 2   Moving or speaking so slowly that other people could have noticed? Or the opposite - being so fidgety or restless that you have been moving around a lot more than usual? 0   Thoughts that you would be better off dead, or of hurting yourself in some way? 0   PHQ-9 Total  Score 6   If you checked off any problems, how difficult have these problems made it for you to do your work, take care of things at home, or get along with other people? Very difficult           Never smoker    I advised Francisco Javier of the risks of tobacco use.     Lab Results:   No visits with results within 3 Month(s) from this visit.   Latest known visit with results is:   Clinical Support on 04/22/2021   Component Date Value Ref Range Status   • WBC 04/22/2021 8.1  3.4 - 10.8 x10E3/uL Final   • RBC 04/22/2021 4.79  4.14 - 5.80 x10E6/uL Final   • Hemoglobin 04/22/2021 14.3  13.0 - 17.7 g/dL Final   • Hematocrit 04/22/2021 42.2  37.5 - 51.0 % Final   • MCV 04/22/2021 88  79 - 97 fL Final   • MCH 04/22/2021 29.9  26.6 - 33.0 pg Final   • MCHC 04/22/2021 33.9  31.5 - 35.7 g/dL Final   • RDW 04/22/2021 12.4  11.6 - 15.4 % Final   • Platelets 04/22/2021 473* 150 - 450 x10E3/uL Final   • Neutrophil Rel % 04/22/2021 68  Not Estab. % Final   • Lymphocyte Rel % 04/22/2021 20  Not Estab. % Final   • Monocyte Rel % 04/22/2021 9  Not Estab. % Final   • Eosinophil Rel % 04/22/2021 2  Not Estab. % Final   • Basophil Rel % 04/22/2021 1  Not Estab. % Final   • Neutrophils Absolute 04/22/2021 5.6  1.4 - 7.0 x10E3/uL Final   • Lymphocytes Absolute 04/22/2021 1.6  0.7 - 3.1 x10E3/uL Final   • Monocytes Absolute 04/22/2021 0.7  0.1 - 0.9 x10E3/uL Final   • Eosinophils Absolute 04/22/2021 0.1  0.0 - 0.4 x10E3/uL Final   • Basophils Absolute 04/22/2021 0.1  0.0 - 0.2 x10E3/uL Final   • Immature Granulocyte Rel % 04/22/2021 0  Not Estab. % Final   • Immature Grans Absolute 04/22/2021 0.0  0.0 - 0.1 x10E3/uL Final   • Glucose 04/22/2021 77  65 - 99 mg/dL Final   • BUN 04/22/2021 9  6 - 24 mg/dL Final   • Creatinine 04/22/2021 1.06  0.76 - 1.27 mg/dL Final   • eGFR Non  Am 04/22/2021 87  >59 mL/min/1.73 Final   • eGFR African Am 04/22/2021 101  >59 mL/min/1.73 Final    Comment: **[facility] currently reports eGFR in compliance with the  current**    recommendations of the National Kidney Foundation. [facility] will    update reporting as new guidelines are published from the NKF-ASN    Task force.     • BUN/Creatinine Ratio 04/22/2021 8* 9 - 20 Final   • Sodium 04/22/2021 142  134 - 144 mmol/L Final   • Potassium 04/22/2021 4.4  3.5 - 5.2 mmol/L Final   • Chloride 04/22/2021 101  96 - 106 mmol/L Final   • Total CO2 04/22/2021 26  20 - 29 mmol/L Final   • Calcium 04/22/2021 10.1  8.7 - 10.2 mg/dL Final   • Total Protein 04/22/2021 8.0  6.0 - 8.5 g/dL Final   • Albumin 04/22/2021 5.0  4.0 - 5.0 g/dL Final   • Globulin 04/22/2021 3.0  1.5 - 4.5 g/dL Final   • A/G Ratio 04/22/2021 1.7  1.2 - 2.2 Final   • Total Bilirubin 04/22/2021 0.4  0.0 - 1.2 mg/dL Final   • Alkaline Phosphatase 04/22/2021 101  39 - 117 IU/L Final   • AST (SGOT) 04/22/2021 25  0 - 40 IU/L Final   • ALT (SGPT) 04/22/2021 36  0 - 44 IU/L Final   • TSH 04/22/2021 1.210  0.450 - 4.500 uIU/mL Final   • 25 Hydroxy, Vitamin D 04/22/2021 95.0  30.0 - 100.0 ng/mL Final    Comment: Vitamin D deficiency has been defined by the [place] of  Medicine and an Endocrine Society practice guideline as a  level of serum 25-OH vitamin D less than 20 ng/mL (1,2).  The Endocrine Society went on to further define vitamin D  insufficiency as a level between 21 and 29 ng/mL (2).  1. IOM ([place] of Medicine). 2010. Dietary reference     intakes for calcium and D. [place]: The     National Academies Press.  2. Elver MF, Lebron DEJESUS, Arnulfo MO, et al.     Evaluation, treatment, and prevention of vitamin D     deficiency: an Endocrine Society clinical practice     guideline. JCEM. 2011 Jul; 96(7):1911-30.     • Vitamin B-12 04/22/2021 1,047  232 - 1,245 pg/mL Final   • Folate 04/22/2021 7.0  >3.0 ng/mL Final    Comment: A serum folate concentration of less than 3.1 ng/mL is  considered to represent clinical deficiency.     • Testosterone, Total 04/22/2021 391  264 - 916 ng/dL Final    Comment:  Adult male reference interval is based on a population of  healthy nonobese males (BMI <30) between 19 and 39 years old.  Charline, et.al. JCEM 2017,102;0931-3277. PMID: 06774219.       **Effective May 10, 2021 Testosterone, Serum**         reference interval will be changing to:               Age                Male          Female            0 - 30 days          0 - 650        4 - 190            1 -  5 months        0 - 650        0 -  42                 6 months        0 -  36        0 -  42            7m-  1 year          0 -  36        1 -  26            2 -  5 years         0 -  36        3 -  33            6 -  8 years         0 -  36        3 -  25            9 - 10 years         0 -  21        1 -  33                11 years         1 - 161        5 -  58                12 years         2 - 521        5 -  58           13 - 15 years        28 - 656       12 -  71           16 - 17 years       150 - 785       12 -  71           18 - 19 years       150 - 785       13 -  71                                      20 - 30 years       264 - 916       13 -  71           31 - 40 years       264 - 916        8 -  60           41 - 60 years       264 - 916        4 -  50           61 - 80 years       264 - 916        3 -  67               >80 years       264 - 916        2 -  45     • Testosterone, Free 04/22/2021 5.9* 6.8 - 21.5 pg/mL Final   • Vitamin B1, Whole Blood 04/22/2021 CANCELED  nmol/L Final-Edited    Comment: Test not performed. No frozen whole blood received.    Result canceled by the ancillary.     • Specimen Status 04/22/2021 CANCELED   Final-Edited    Comment: Test not performed. No frozen whole blood received.        TEST:  [id number]  Vitamin B1 (Thiamine), Blood    Result canceled by the ancillary.         Assessment/Plan   Problems Addressed this Visit        Mental Health    Mild episode of recurrent major depressive disorder (HCC) - Primary (Chronic)    Relevant Medications     amphetamine-dextroamphetamine (Adderall) 20 MG tablet    Attention deficit hyperactivity disorder, combined type (Chronic)    Relevant Medications    amphetamine-dextroamphetamine (Adderall) 20 MG tablet      Diagnoses       Codes Comments    Mild episode of recurrent major depressive disorder (HCC)    -  Primary ICD-10-CM: F33.0  ICD-9-CM: 296.31     Attention deficit hyperactivity disorder, combined type     ICD-10-CM: F90.2  ICD-9-CM: 314.01           Visit Diagnoses:    ICD-10-CM ICD-9-CM   1. Mild episode of recurrent major depressive disorder (HCC)  F33.0 296.31   2. Attention deficit hyperactivity disorder, combined type  F90.2 314.01       TREATMENT PLAN/GOALS: Continue supportive psychotherapy efforts and medications as indicated. Treatment and medication options discussed during today's visit. Patient ackowledged and verbally consented to continue with current treatment plan and was educated on the importance of compliance with treatment and follow-up appointments.    MEDICATION ISSUES:  INSPECT reviewed as expected - 10/7/2021 adderall # 60     1. ADHD  Combined - cont  Adderal, increase to 20 mg TID, adderall XR was offered, he wanted to try immediate release first   Consider Adderall XR or Mydayis      2. MDD- the pt is taking gabapentin from PCP   PHQ scored 6  and indicated mild  depression     Discussed medication options and treatment plan of prescribed medication as well as the risks, benefits, and side effects including potential falls, possible impaired driving and metabolic adversities among others. Patient is agreeable to call the office with any worsening of symptoms or onset of side effects. Patient is agreeable to call 911 or go to the nearest ER should he/she begin having SI/HI. No medication side effects or related complaints today.     MEDS ORDERED DURING VISIT:  New Medications Ordered This Visit   Medications   • amphetamine-dextroamphetamine (Adderall) 20 MG tablet     Sig: Take 1  tablet by mouth 3 (Three) Times a Day.     Dispense:  90 tablet     Refill:  0       Return in about 3 months (around 1/29/2022).         This document has been electronically signed by Maria De Jesus Quintanilla MD  October 29, 2021 08:51 EDT    EMR Dragon transcription disclaimer:  Some of this encounter note is an electronic transcription translation of spoken language to printed text. The electronic translation of spoken language may permit erroneous, or at times, nonsensical words or phrases to be inadvertently transcribed; Although I have reviewed the note for such errors some may still exist.

## 2021-11-29 DIAGNOSIS — F90.2 ATTENTION DEFICIT HYPERACTIVITY DISORDER, COMBINED TYPE: Chronic | ICD-10-CM

## 2021-11-29 NOTE — TELEPHONE ENCOUNTER
Rx Refill Note  Requested Prescriptions     Pending Prescriptions Disp Refills   • amphetamine-dextroamphetamine (Adderall) 20 MG tablet 90 tablet 0     Sig: Take 1 tablet by mouth 3 (Three) Times a Day.      Last office visit with prescribing clinician: 10/29/2021      Next office visit with prescribing clinician: 2/9/2022   Office Visit with Maria De Jesus Quintanilla MD (10/29/2021)           Gricelda Gaona MA  11/29/21, 10:41 EST     INSPECT SUHAS

## 2021-11-30 RX ORDER — DEXTROAMPHETAMINE SACCHARATE, AMPHETAMINE ASPARTATE, DEXTROAMPHETAMINE SULFATE AND AMPHETAMINE SULFATE 5; 5; 5; 5 MG/1; MG/1; MG/1; MG/1
20 TABLET ORAL 3 TIMES DAILY
Qty: 90 TABLET | Refills: 0 | Status: SHIPPED | OUTPATIENT
Start: 2021-11-30 | End: 2021-12-27 | Stop reason: SDUPTHER

## 2021-12-22 DIAGNOSIS — F33.0 MILD EPISODE OF RECURRENT MAJOR DEPRESSIVE DISORDER (HCC): ICD-10-CM

## 2021-12-23 RX ORDER — GABAPENTIN 400 MG/1
CAPSULE ORAL
Qty: 90 CAPSULE | Refills: 0 | Status: SHIPPED | OUTPATIENT
Start: 2021-12-23 | End: 2023-01-11 | Stop reason: SDUPTHER

## 2021-12-27 DIAGNOSIS — F90.2 ATTENTION DEFICIT HYPERACTIVITY DISORDER, COMBINED TYPE: Chronic | ICD-10-CM

## 2021-12-27 NOTE — TELEPHONE ENCOUNTER
Rx Refill Note  Requested Prescriptions     Pending Prescriptions Disp Refills   • amphetamine-dextroamphetamine (Adderall) 20 MG tablet 90 tablet 0     Sig: Take 1 tablet by mouth 3 (Three) Times a Day.      Last office visit with prescribing clinician: 10/29/2021      Next office visit with prescribing clinician: 2/9/2022   Office Visit with Maria De Jesus Quintanilla MD (10/29/2021)           Teresa Corcoran MA  12/27/21, 11:40 EST     Inspect printed

## 2021-12-29 RX ORDER — DEXTROAMPHETAMINE SACCHARATE, AMPHETAMINE ASPARTATE, DEXTROAMPHETAMINE SULFATE AND AMPHETAMINE SULFATE 5; 5; 5; 5 MG/1; MG/1; MG/1; MG/1
20 TABLET ORAL 3 TIMES DAILY
Qty: 90 TABLET | Refills: 0 | Status: SHIPPED | OUTPATIENT
Start: 2021-12-29 | End: 2022-01-27 | Stop reason: SDUPTHER

## 2022-01-27 DIAGNOSIS — F90.2 ATTENTION DEFICIT HYPERACTIVITY DISORDER, COMBINED TYPE: Chronic | ICD-10-CM

## 2022-01-27 NOTE — TELEPHONE ENCOUNTER
Rx Refill Note  Requested Prescriptions     Pending Prescriptions Disp Refills   • amphetamine-dextroamphetamine (Adderall) 20 MG tablet 90 tablet 0     Sig: Take 1 tablet by mouth 3 (Three) Times a Day.      Last office visit with prescribing clinician: 10/29/2021      Next office visit with prescribing clinician: 2/9/2022     Office Visit with Maria De Jesus Quintanilla MD (10/29/2021)         Gricelda Gaona MA  01/27/22, 15:59 EST     Inspect ran

## 2022-01-28 RX ORDER — DEXTROAMPHETAMINE SACCHARATE, AMPHETAMINE ASPARTATE, DEXTROAMPHETAMINE SULFATE AND AMPHETAMINE SULFATE 5; 5; 5; 5 MG/1; MG/1; MG/1; MG/1
20 TABLET ORAL 3 TIMES DAILY
Qty: 90 TABLET | Refills: 0 | Status: SHIPPED | OUTPATIENT
Start: 2022-01-28 | End: 2022-02-09 | Stop reason: SDUPTHER

## 2022-02-09 ENCOUNTER — OFFICE VISIT (OUTPATIENT)
Dept: PSYCHIATRY | Facility: CLINIC | Age: 42
End: 2022-02-09

## 2022-02-09 DIAGNOSIS — F90.2 ATTENTION DEFICIT HYPERACTIVITY DISORDER, COMBINED TYPE: Chronic | ICD-10-CM

## 2022-02-09 DIAGNOSIS — Z79.899 ENCOUNTER FOR LONG-TERM (CURRENT) USE OF OTHER MEDICATIONS: ICD-10-CM

## 2022-02-09 DIAGNOSIS — F33.0 MILD EPISODE OF RECURRENT MAJOR DEPRESSIVE DISORDER: Primary | Chronic | ICD-10-CM

## 2022-02-09 PROCEDURE — 99214 OFFICE O/P EST MOD 30 MIN: CPT | Performed by: PSYCHIATRY & NEUROLOGY

## 2022-02-09 RX ORDER — BUPROPION HYDROCHLORIDE 100 MG/1
TABLET, EXTENDED RELEASE ORAL
Qty: 60 TABLET | Refills: 2 | Status: SHIPPED | OUTPATIENT
Start: 2022-02-09 | End: 2022-05-10 | Stop reason: SINTOL

## 2022-02-09 RX ORDER — DEXTROAMPHETAMINE SACCHARATE, AMPHETAMINE ASPARTATE, DEXTROAMPHETAMINE SULFATE AND AMPHETAMINE SULFATE 5; 5; 5; 5 MG/1; MG/1; MG/1; MG/1
20 TABLET ORAL 3 TIMES DAILY
Qty: 90 TABLET | Refills: 0 | Status: SHIPPED | OUTPATIENT
Start: 2022-02-09 | End: 2022-03-23 | Stop reason: SDUPTHER

## 2022-02-09 NOTE — PATIENT INSTRUCTIONS
Attention Deficit Hyperactivity Disorder, Adult  Attention deficit hyperactivity disorder (ADHD) is a mental health disorder that starts during childhood (neurodevelopmental disorder). For many people with ADHD, the disorder continues into the adult years. Treatment can help you manage your symptoms.  What are the causes?  The exact cause of ADHD is not known. Most experts believe genetics and environmental factors contribute to ADHD.  What increases the risk?  The following factors may make you more likely to develop this condition:  · Having a family history of ADHD.  · Being male.  · Being born to a mother who smoked or drank alcohol during pregnancy.  · Being exposed to lead or other toxins in the womb or early in life.  · Being born before 37 weeks of pregnancy (prematurely) or at a low birth weight.  · Having experienced a brain injury.  What are the signs or symptoms?  Symptoms of this condition depend on the type of ADHD. The two main types are inattentive and hyperactive-impulsive. Some people may have symptoms of both types.  Symptoms of the inattentive type include:  · Difficulty paying attention.  · Making careless mistakes.  · Not following instructions.  · Being disorganized.  · Avoiding tasks that require time and attention.  · Losing and forgetting things.  · Being easily distracted.  Symptoms of the hyperactive-impulsive type include:  · Restlessness.  · Talking too much.  · Interrupting.  · Difficulty with:  ? Sitting still.  ? Feeling motivated.  ? Relaxing.  ? Waiting in line or waiting for a turn.  In adults, this condition may lead to certain problems, such as:  · Keeping jobs.  · Performing tasks at work.  · Having stable relationships.  · Being on time or keeping to a schedule.  How is this diagnosed?  This condition is diagnosed based on your current symptoms and your history of symptoms. The diagnosis can be made by a health care provider such as a primary care provider or a mental health  care specialist.  Your health care provider may use a symptom checklist or a behavior rating scale to evaluate your symptoms. He or she may also want to talk with people who have observed your behaviors throughout your life.  How is this treated?  This condition can be treated with medicines and behavior therapy. Medicines may be the best option to reduce impulsive behaviors and improve attention. Your health care provider may recommend:  · Stimulant medicines. These are the most common medicines used for adult ADHD. They affect certain chemicals in the brain (neurotransmitters) and improve your ability to control your symptoms.  · A non-stimulant medicine for adult ADHD (atomoxetine). This medicine increases a neurotransmitter called norepinephrine. It may take weeks to months to see effects from this medicine.  Counseling and behavioral management are also important for treating ADHD. Counseling is often used along with medicine. Your health care provider may suggest:  · Cognitive behavioral therapy (CBT). This type of therapy teaches you to replace negative thoughts and actions with positive thoughts and actions. When used as part of ADHD treatment, this therapy may also include:  ? Coping strategies for organization, time management, impulse control, and stress reduction.  ? Mindfulness and meditation training.  · Behavioral management. You may work with a  who is specially trained to help people with ADHD manage and organize activities and function more effectively.  Follow these instructions at home:  Medicines    · Take over-the-counter and prescription medicines only as told by your health care provider.  · Talk with your health care provider about the possible side effects of your medicines and how to manage them.    Lifestyle    · Do not use drugs.  · Do not drink alcohol if:  ? Your health care provider tells you not to drink.  ? You are pregnant, may be pregnant, or are planning to become  pregnant.  · If you drink alcohol:  ? Limit how much you use to:  § 0-1 drink a day for women.  § 0-2 drinks a day for men.  ? Be aware of how much alcohol is in your drink. In the [place] one drink equals one 12 oz bottle of beer (355 mL), one 5 oz glass of wine (148 mL), or one 1½ oz glass of hard liquor (44 mL).  · Get enough sleep.  · Eat a healthy diet.  · Exercise regularly. Exercise can help to reduce stress and anxiety.    General instructions  · Learn as much as you can about adult ADHD, and work closely with your health care providers to find the treatments that work best for you.  · Follow the same schedule each day.  · Use reminder devices like notes, calendars, and phone apps to stay on time and organized.  · Keep all follow-up visits as told by your health care provider and therapist. This is important.  Where to find more information  A health care provider may be able to recommend resources that are available online or over the phone. You could start with:  · Attention Deficit Disorder Association (ADDA): www.add.org  · National [place] of Mental Health (NIM): www.nimh.nih.gov  Contact a health care provider if:  · Your symptoms continue to cause problems.  · You have side effects from your medicine, such as:  ? Repeated muscle twitches, coughing, or speech outbursts.  ? Sleep problems.  ? Loss of appetite.  ? Dizziness.  ? Unusually fast heartbeat.  ? Stomach pains.  ? Headaches.  · You are struggling with anxiety, depression, or substance abuse.  Get help right away if you:  · Have a severe reaction to a medicine.  If you ever feel like you may hurt yourself or others, or have thoughts about taking your own life, get help right away. You can go to the nearest emergency department or call:  · Your local emergency services (911 in the [place]  · A suicide crisis helpline, such as the National Suicide Prevention Lifeline at 1-720.487.1346. This is open 24 hours a day.  Summary  · ADHD is a mental  health disorder that starts during childhood (neurodevelopmental disorder) and often continues into the adult years.  · The exact cause of ADHD is not known. Most experts believe genetics and environmental factors contribute to ADHD.  · There is no cure for ADHD, but treatment with medicine, cognitive behavioral therapy, or behavioral management can help you manage your condition.  This information is not intended to replace advice given to you by your health care provider. Make sure you discuss any questions you have with your health care provider.  Document Revised: 05/11/2020 Document Reviewed: 05/11/2020  [employer] Patient Education © 2021 [employer]

## 2022-02-09 NOTE — PROGRESS NOTES
Subjective   Francisco Javier Chavarria is a 41 y.o. male who presents today for follow up    Chief Complaint:   Decreased concentration , depression      History of Present Illness: the pt c/o difficulties with concentration since he was at school, at that time, he was day dreaming, did not finish  HS, had trouble finishing tasks, difficult to read, his parents did not pay attention, not supporting meds.  In 11 grade he had issues with attendance, because he could not keep everything under control, was trying to do one step at a time, he was 1:1 with the teacher he was doing better.  He also had troubles with impulsivity , fidgety, had troubles due to behavior   He had the same issues at home with parents, was forgetful     Now he noticed that concentration affects his job, he works as  and he needs help with a lot of things, hard to multitask, can not stay focused on his work , he was trying to avoid activity that require concentration, he frequently takes work to his home to finish without interruptions   Concentration also affects his family life     When the pt is under pressure he is making errors, sometimes even can not spell his name     Sleep - good   Mood - overwhelmed when can not complete his task  Anxiety - increased worries about minor little things, anxiety is associated with increased tension, irritability     Denied sxs of alexa /hypomania     Today the pt reported feeling better during the day, still stays focused but at the same time he noticed tolerance   In the evening he feels more depressed, with decreased E level ,  The pt changed jobs , now off on the weekends       The following portions of the patient's history were reviewed and updated as appropriate: allergies, current medications, past family history, past medical history, past social history, past surgical history and problem list.    PAST PSYCHIATRIC HISTORY  Axis I  No inpt , no SI, no SAs   Axis II  Defer     PAST OUTPATIENT  TREATMENT  Diagnosis treated:  Anxiety/Panic Disorder, ADD  Treatment Type:  meds   Prior Psychiatric Medications:  Prozac, zoloft, lexapro - sedation   Support Groups:  None   Sequelae Of Mental Disorder:  job disruption, social isolation, emotional distress          Interval History  No changes     Side Effects  Denied        Past Medical History:  Past Medical History:   Diagnosis Date   • Allergic    • Anxiety    • Depression    • Headache        Social History:  Social History     Socioeconomic History   • Marital status: Single   Tobacco Use   • Smoking status: Never Smoker   • Smokeless tobacco: Current User   Vaping Use   • Vaping Use: Never used   Substance and Sexual Activity   • Alcohol use: Yes     Alcohol/week: 4.0 - 5.0 standard drinks     Types: 4 - 5 Cans of beer per week     Comment: once per month    • Drug use: No   • Sexual activity: Defer       Family History:  Family History   Problem Relation Age of Onset   • Depression Mother    • Anxiety disorder Mother    • Other Mother    • Mental illness Mother    • Cancer Father    • Depression Sister    • Anxiety disorder Sister    • Other Sister        Past Surgical History:  History reviewed. No pertinent surgical history.    Problem List:  Patient Active Problem List   Diagnosis   • Irritable bowel syndrome with diarrhea   • Mild episode of recurrent major depressive disorder (HCC)   • Right ear pain   • Sting of hornets, wasps, and bees causing poisoning and toxic reactions   • Physical exam   • Testosterone deficiency   • Attention deficit hyperactivity disorder, combined type       Allergy:   Allergies   Allergen Reactions   • Wasp Venom Hives     The patient states last time this happened, he has never been the same since.   • Seasonal Ic [Cholestatin] Itching     Certain things         Discontinued Medications:  Medications Discontinued During This Encounter   Medication Reason   • amphetamine-dextroamphetamine (Adderall) 20 MG tablet Reorder        Current Medications:   Current Outpatient Medications   Medication Sig Dispense Refill   • amphetamine-dextroamphetamine (Adderall) 20 MG tablet Take 1 tablet by mouth 3 (Three) Times a Day. 90 tablet 0   • buPROPion SR (Wellbutrin SR) 100 MG 12 hr tablet 1 tab po at 8 AM and 2 PM 60 tablet 2   • dicyclomine (BENTYL) 20 MG tablet Take 1 tablet by mouth Every 6 (Six) Hours. 120 tablet 3   • gabapentin (NEURONTIN) 400 MG capsule TAKE 1 CAPSULE BY MOUTH THREE TIMES DAILY 90 capsule 0     No current facility-administered medications for this visit.         Psychological ROS: positive for - depression and concentration difficulties  negative for - hallucinations, hostility, irritability, memory difficulties, mood swings, physical abuse or sexual abuse      Physical Exam:   There were no vitals taken for this visit.    Mental Status Exam:   Hygiene:   good  Cooperation:  Cooperative  Eye Contact:  Good  Psychomotor Behavior:  Appropriate  Affect:  Appropriate  Mood: fluctates  Hopelessness: Denies  Speech:  Normal  Thought Process:  Goal directed and Linear  Thought Content:  Mood congruent  Suicidal:  None  Homicidal:  None  Hallucinations:  None  Delusion:  None  Memory:  Intact  Orientation:  Person, Place, Time and Situation  Reliability:  good  Insight:  Good  Judgement:  Fair  Impulse Control:  Fair  Physical/Medical Issues:  No    MSE from 10/29/2021 reviewed and no  changes necessary     PHQ-9 Depression Screening  Little interest or pleasure in doing things? 2   Feeling down, depressed, or hopeless? 2   Trouble falling or staying asleep, or sleeping too much? 0   Feeling tired or having little energy? 2   Poor appetite or overeating? 0   Feeling bad about yourself - or that you are a failure or have let yourself or your family down? 2   Trouble concentrating on things, such as reading the newspaper or watching television? 2   Moving or speaking so slowly that other people could have noticed? Or the opposite  - being so fidgety or restless that you have been moving around a lot more than usual? 0   Thoughts that you would be better off dead, or of hurting yourself in some way? 0   PHQ-9 Total Score 10   If you checked off any problems, how difficult have these problems made it for you to do your work, take care of things at home, or get along with other people? Very difficult           Never smoker    I advised Francisco Javier of the risks of tobacco use.     Lab Results:   No visits with results within 3 Month(s) from this visit.   Latest known visit with results is:   Clinical Support on 04/22/2021   Component Date Value Ref Range Status   • WBC 04/22/2021 8.1  3.4 - 10.8 x10E3/uL Final   • RBC 04/22/2021 4.79  4.14 - 5.80 x10E6/uL Final   • Hemoglobin 04/22/2021 14.3  13.0 - 17.7 g/dL Final   • Hematocrit 04/22/2021 42.2  37.5 - 51.0 % Final   • MCV 04/22/2021 88  79 - 97 fL Final   • MCH 04/22/2021 29.9  26.6 - 33.0 pg Final   • MCHC 04/22/2021 33.9  31.5 - 35.7 g/dL Final   • RDW 04/22/2021 12.4  11.6 - 15.4 % Final   • Platelets 04/22/2021 473* 150 - 450 x10E3/uL Final   • Neutrophil Rel % 04/22/2021 68  Not Estab. % Final   • Lymphocyte Rel % 04/22/2021 20  Not Estab. % Final   • Monocyte Rel % 04/22/2021 9  Not Estab. % Final   • Eosinophil Rel % 04/22/2021 2  Not Estab. % Final   • Basophil Rel % 04/22/2021 1  Not Estab. % Final   • Neutrophils Absolute 04/22/2021 5.6  1.4 - 7.0 x10E3/uL Final   • Lymphocytes Absolute 04/22/2021 1.6  0.7 - 3.1 x10E3/uL Final   • Monocytes Absolute 04/22/2021 0.7  0.1 - 0.9 x10E3/uL Final   • Eosinophils Absolute 04/22/2021 0.1  0.0 - 0.4 x10E3/uL Final   • Basophils Absolute 04/22/2021 0.1  0.0 - 0.2 x10E3/uL Final   • Immature Granulocyte Rel % 04/22/2021 0  Not Estab. % Final   • Immature Grans Absolute 04/22/2021 0.0  0.0 - 0.1 x10E3/uL Final   • Glucose 04/22/2021 77  65 - 99 mg/dL Final   • BUN 04/22/2021 9  6 - 24 mg/dL Final   • Creatinine 04/22/2021 1.06  0.76 - 1.27 mg/dL Final    • eGFR Non African Am 04/22/2021 87  >59 mL/min/1.73 Final   • eGFR African Am 04/22/2021 101  >59 mL/min/1.73 Final    Comment: **[facility] currently reports eGFR in compliance with the current**    recommendations of the National Kidney Foundation. [facility] will    update reporting as new guidelines are published from the NKF-ASN    Task force.     • BUN/Creatinine Ratio 04/22/2021 8* 9 - 20 Final   • Sodium 04/22/2021 142  134 - 144 mmol/L Final   • Potassium 04/22/2021 4.4  3.5 - 5.2 mmol/L Final   • Chloride 04/22/2021 101  96 - 106 mmol/L Final   • Total CO2 04/22/2021 26  20 - 29 mmol/L Final   • Calcium 04/22/2021 10.1  8.7 - 10.2 mg/dL Final   • Total Protein 04/22/2021 8.0  6.0 - 8.5 g/dL Final   • Albumin 04/22/2021 5.0  4.0 - 5.0 g/dL Final   • Globulin 04/22/2021 3.0  1.5 - 4.5 g/dL Final   • A/G Ratio 04/22/2021 1.7  1.2 - 2.2 Final   • Total Bilirubin 04/22/2021 0.4  0.0 - 1.2 mg/dL Final   • Alkaline Phosphatase 04/22/2021 101  39 - 117 IU/L Final   • AST (SGOT) 04/22/2021 25  0 - 40 IU/L Final   • ALT (SGPT) 04/22/2021 36  0 - 44 IU/L Final   • TSH 04/22/2021 1.210  0.450 - 4.500 uIU/mL Final   • 25 Hydroxy, Vitamin D 04/22/2021 95.0  30.0 - 100.0 ng/mL Final    Comment: Vitamin D deficiency has been defined by the [employer] and an Endocrine Society practice guideline as a  level of serum 25-OH vitamin D less than 20 ng/mL (1,2).  The Endocrine Society went on to further define vitamin D  insufficiency as a level between 21 and 29 ng/mL (2).  1. IOM ([employer]). 2010. Dietary reference     intakes for calcium and D. [place]: The     National Academies Press.  2. Elver BENTON, Lebron DEJESUS, [place], et al.     Evaluation, treatment, and prevention of vitamin D     deficiency: an Endocrine Society clinical practice     guideline. JCEM. 2011 Jul; 96(7):1911-30.     • Vitamin B-12 04/22/2021 1,047  232 - 1,245 pg/mL Final   • Folate 04/22/2021 7.0  >3.0 ng/mL Final     Comment: A serum folate concentration of less than 3.1 ng/mL is  considered to represent clinical deficiency.     • Testosterone, Total 04/22/2021 391  264 - 916 ng/dL Final    Comment: Adult male reference interval is based on a population of  healthy nonobese males (BMI <30) between 19 and 39 years old.  edwin Olivier.al. JCEM 2017,102;7522-2379. PMID: 29153589.       **Effective May 10, 2021 Testosterone, Serum**         reference interval will be changing to:               Age                Male          Female            0 - 30 days          0 - 650        4 - 190            1 -  5 months        0 - 650        0 -  42                 6 months        0 -  36        0 -  42            7m-  1 year          0 -  36        1 -  26            2 -  5 years         0 -  36        3 -  33            6 -  8 years         0 -  36        3 -  25            9 - 10 years         0 -  21        1 -  33                11 years         1 - 161        5 -  58                12 years         2 - 521        5 -  58           13 - 15 years        28 - 656       12 -  71           16 - 17 years       150 - 785       12 -  71           18 - 19 years       150 - 785       13 -  71                                      20 - 30 years       264 - 916       13 -  71           31 - 40 years       264 - 916        8 -  60           41 - 60 years       264 - 916        4 -  50           61 - 80 years       264 - 916        3 -  67               >80 years       264 - 916        2 -  45     • Testosterone, Free 04/22/2021 5.9* 6.8 - 21.5 pg/mL Final   • Vitamin B1, Whole Blood 04/22/2021 CANCELED  nmol/L Final-Edited    Comment: Test not performed. No frozen whole blood received.    Result canceled by the ancillary.     • Specimen Status 04/22/2021 CANCELED   Final-Edited    Comment: Test not performed. No frozen whole blood received.        TEST:  [id number]  Vitamin B1 (Thiamine), Blood    Result canceled by the ancillary.          Assessment/Plan   Problems Addressed this Visit        Mental Health    Mild episode of recurrent major depressive disorder (HCC) - Primary (Chronic)    Relevant Medications    buPROPion SR (Wellbutrin SR) 100 MG 12 hr tablet    amphetamine-dextroamphetamine (Adderall) 20 MG tablet    Attention deficit hyperactivity disorder, combined type (Chronic)    Relevant Medications    buPROPion SR (Wellbutrin SR) 100 MG 12 hr tablet    amphetamine-dextroamphetamine (Adderall) 20 MG tablet    Other Relevant Orders    [facility] Urine Drug Screen -      Other Visit Diagnoses     Encounter for long-term (current) use of other medications        Relevant Orders    [facility] Urine Drug Screen -      Diagnoses       Codes Comments    Mild episode of recurrent major depressive disorder (HCC)    -  Primary ICD-10-CM: F33.0  ICD-9-CM: 296.31     Attention deficit hyperactivity disorder, combined type     ICD-10-CM: F90.2  ICD-9-CM: 314.01     Encounter for long-term (current) use of other medications     ICD-10-CM: Z79.899  ICD-9-CM: V58.69           Visit Diagnoses:    ICD-10-CM ICD-9-CM   1. Mild episode of recurrent major depressive disorder (HCC)  F33.0 296.31   2. Attention deficit hyperactivity disorder, combined type  F90.2 314.01   3. Encounter for long-term (current) use of other medications  Z79.899 V58.69       TREATMENT PLAN/GOALS: Continue supportive psychotherapy efforts and medications as indicated. Treatment and medication options discussed during today's visit. Patient ackowledged and verbally consented to continue with current treatment plan and was educated on the importance of compliance with treatment and follow-up appointments.    MEDICATION ISSUES:  INSPECT reviewed as expected - 1/28/2021 adderall # 60     1. ADHD  Combined - cont  Adderall  20 mg TID, adderall XR was offered, he wanted to try immediate release first   Consider Adderall XR or Mydayis      Advised not to take stimulants on weekends     2. MDD- add  wellbutrin  mg BID     3. Long term therapeutic drug monitoring - UDS today     PHQ scored 10  and indicated moderate   depression   JACLYN 7 scored 4     Discussed medication options and treatment plan of prescribed medication as well as the risks, benefits, and side effects including potential falls, possible impaired driving and metabolic adversities among others. Patient is agreeable to call the office with any worsening of symptoms or onset of side effects. Patient is agreeable to call 911 or go to the nearest ER should he/she begin having SI/HI. No medication side effects or related complaints today.     MEDS ORDERED DURING VISIT:  New Medications Ordered This Visit   Medications   • buPROPion SR (Wellbutrin SR) 100 MG 12 hr tablet     Si tab po at 8 AM and 2 PM     Dispense:  60 tablet     Refill:  2   • amphetamine-dextroamphetamine (Adderall) 20 MG tablet     Sig: Take 1 tablet by mouth 3 (Three) Times a Day.     Dispense:  90 tablet     Refill:  0     Please dispense on or after 2022       Return in about 3 months (around 2022).         This document has been electronically signed by Maria De Jesus Quintanilla MD  2022 08:44 EST    [facility] Dragon transcription disclaimer:  Some of this encounter note is an electronic transcription translation of spoken language to printed text. The electronic translation of spoken language may permit erroneous, or at times, nonsensical words or phrases to be inadvertently transcribed; Although I have reviewed the note for such errors some may still exist.

## 2022-03-23 ENCOUNTER — TELEPHONE (OUTPATIENT)
Dept: PSYCHIATRY | Facility: CLINIC | Age: 42
End: 2022-03-23

## 2022-03-23 DIAGNOSIS — F90.2 ATTENTION DEFICIT HYPERACTIVITY DISORDER, COMBINED TYPE: Chronic | ICD-10-CM

## 2022-03-23 RX ORDER — DEXTROAMPHETAMINE SACCHARATE, AMPHETAMINE ASPARTATE, DEXTROAMPHETAMINE SULFATE AND AMPHETAMINE SULFATE 5; 5; 5; 5 MG/1; MG/1; MG/1; MG/1
20 TABLET ORAL 3 TIMES DAILY
Qty: 90 TABLET | Refills: 0 | Status: SHIPPED | OUTPATIENT
Start: 2022-03-23 | End: 2022-04-22 | Stop reason: SDUPTHER

## 2022-04-22 DIAGNOSIS — F90.2 ATTENTION DEFICIT HYPERACTIVITY DISORDER, COMBINED TYPE: Chronic | ICD-10-CM

## 2022-04-22 RX ORDER — DEXTROAMPHETAMINE SACCHARATE, AMPHETAMINE ASPARTATE, DEXTROAMPHETAMINE SULFATE AND AMPHETAMINE SULFATE 5; 5; 5; 5 MG/1; MG/1; MG/1; MG/1
20 TABLET ORAL 3 TIMES DAILY
Qty: 90 TABLET | Refills: 0 | Status: SHIPPED | OUTPATIENT
Start: 2022-04-22 | End: 2022-05-24 | Stop reason: SDUPTHER

## 2022-04-22 NOTE — TELEPHONE ENCOUNTER
Rx Refill Note  Requested Prescriptions     Pending Prescriptions Disp Refills   • amphetamine-dextroamphetamine (Adderall) 20 MG tablet 90 tablet 0     Sig: Take 1 tablet by mouth 3 (Three) Times a Day.      Last office visit with prescribing clinician: 2/9/2022      Next office visit with prescribing clinician: 5/10/2022   Office Visit with Maria De Jesus Quintanilla MD (02/09/2022)       [facility] Urine Drug Screen - (02/09/2022)      Teresa Corcoran MA  04/22/22, 12:23 EDT       Inspect printed

## 2022-05-10 ENCOUNTER — TELEPHONE (OUTPATIENT)
Dept: PSYCHIATRY | Facility: CLINIC | Age: 42
End: 2022-05-10

## 2022-05-10 ENCOUNTER — OFFICE VISIT (OUTPATIENT)
Dept: PSYCHIATRY | Facility: CLINIC | Age: 42
End: 2022-05-10

## 2022-05-10 DIAGNOSIS — F90.2 ATTENTION DEFICIT HYPERACTIVITY DISORDER, COMBINED TYPE: Chronic | ICD-10-CM

## 2022-05-10 DIAGNOSIS — F33.0 MILD EPISODE OF RECURRENT MAJOR DEPRESSIVE DISORDER: Primary | Chronic | ICD-10-CM

## 2022-05-10 PROCEDURE — 99214 OFFICE O/P EST MOD 30 MIN: CPT | Performed by: PSYCHIATRY & NEUROLOGY

## 2022-05-10 NOTE — PROGRESS NOTES
"Subjective   Francisco Javier Chavarria is a 42 y.o. male who presents today for follow up    Chief Complaint:   Decreased concentration      History of Present Illness: the pt c/o difficulties with concentration since he was at school, at that time, he was day dreaming, did not finish  HS, had trouble finishing tasks, difficult to read, his parents did not pay attention, not supporting meds.  In 11 grade he had issues with attendance, because he could not keep everything under control, was trying to do one step at a time, he was 1:1 with the teacher he was doing better.  He also had troubles with impulsivity , fidgety, had troubles due to behavior   He had the same issues at home with parents, was forgetful     Now he noticed that concentration affects his job, he works as  and he needs help with a lot of things, hard to multitask, can not stay focused on his work , he was trying to avoid activity that require concentration, he frequently takes work to his home to finish without interruptions   Concentration also affects his family life     When the pt is under pressure he is making errors, sometimes even can not spell his name     Sleep - good   Mood - overwhelmed when can not complete his task  Anxiety - increased worries about minor little things, anxiety is associated with increased tension, irritability     Denied sxs of alexa /hypomania     Today the pt reported still feeling \"emotional\" , wellbutrn was not effective  Concentration is better on adderal , however, ins did not pay for 90 tabs and at work he does not always have access to meds and immediate release is not very effective   The pt was very reluctant to make changes to XR or mydayis          The following portions of the patient's history were reviewed and updated as appropriate: allergies, current medications, past family history, past medical history, past social history, past surgical history and problem list.    PAST PSYCHIATRIC HISTORY  [place] " I  No inpt , no SI, no SAs   Axis II  Defer     PAST OUTPATIENT TREATMENT  Diagnosis treated:  Anxiety/Panic Disorder, ADD  Treatment Type:  meds   Prior Psychiatric Medications:  Prozac, zoloft, lexapro - sedation   wellbutrin - not effective  Support Groups:  None   Sequelae Of Mental Disorder:  job disruption, social isolation, emotional distress          Interval History  No changes     Side Effects  wellbutrin - more depressed       Past Medical History:  Past Medical History:   Diagnosis Date   • Allergic    • Anxiety    • Depression    • Headache        Social History:  Social History     Socioeconomic History   • Marital status: Single   Tobacco Use   • Smoking status: Never Smoker   • Smokeless tobacco: Current User   Vaping Use   • Vaping Use: Never used   Substance and Sexual Activity   • Alcohol use: Yes     Alcohol/week: 4.0 - 5.0 standard drinks     Types: 4 - 5 Cans of beer per week     Comment: once per month    • Drug use: No   • Sexual activity: Defer       Family History:  Family History   Problem Relation Age of Onset   • Depression Mother    • Anxiety disorder Mother    • Other Mother    • Mental illness Mother    • Cancer Father    • Depression Sister    • Anxiety disorder Sister    • Other Sister        Past Surgical History:  History reviewed. No pertinent surgical history.    Problem List:  Patient Active Problem List   Diagnosis   • Irritable bowel syndrome with diarrhea   • Mild episode of recurrent major depressive disorder (HCC)   • Right ear pain   • Sting of hornets, wasps, and bees causing poisoning and toxic reactions   • Physical exam   • Testosterone deficiency   • Attention deficit hyperactivity disorder, combined type       Allergy:   Allergies   Allergen Reactions   • Wasp Venom Hives     The patient states last time this happened, he has never been the same since.   • Seasonal Ic [Cholestatin] Itching     Certain things         Discontinued Medications:  Medications  Discontinued During This Encounter   Medication Reason   • buPROPion SR (Wellbutrin SR) 100 MG 12 hr tablet Side effects       Current Medications:   Current Outpatient Medications   Medication Sig Dispense Refill   • amphetamine-dextroamphetamine (Adderall) 20 MG tablet Take 1 tablet by mouth 3 (Three) Times a Day. 90 tablet 0   • dicyclomine (BENTYL) 20 MG tablet Take 1 tablet by mouth Every 6 (Six) Hours. 120 tablet 3   • gabapentin (NEURONTIN) 400 MG capsule TAKE 1 CAPSULE BY MOUTH THREE TIMES DAILY 90 capsule 0     No current facility-administered medications for this visit.         Psychological ROS: positive for - depression and concentration difficulties  negative for - hallucinations, hostility, irritability, memory difficulties, mood swings, physical abuse or sexual abuse      Physical Exam:   There were no vitals taken for this visit.    Mental Status Exam:   Hygiene:   good  Cooperation:  Cooperative  Eye Contact:  Good  Psychomotor Behavior:  Appropriate  Affect:  Appropriate  Mood: fluctates  Hopelessness: Denies  Speech:  Normal  Thought Process:  Goal directed and Linear  Thought Content:  Mood congruent  Suicidal:  None  Homicidal:  None  Hallucinations:  None  Delusion:  None  Memory:  Intact  Orientation:  Person, Place, Time and Situation  Reliability:  good  Insight:  Good  Judgement:  Fair  Impulse Control:  Fair  Physical/Medical Issues:  No      MSE from 2/9/2022 reviewed and no  changes necessary   PHQ-9 Depression Screening  Little interest or pleasure in doing things? 2-->more than half the days   Feeling down, depressed, or hopeless? 2-->more than half the days   Trouble falling or staying asleep, or sleeping too much? 1-->several days   Feeling tired or having little energy? 2-->more than half the days   Poor appetite or overeating? 0-->not at all   Feeling bad about yourself - or that you are a failure or have let yourself or your family down? 3-->nearly every day   Trouble  concentrating on things, such as reading the newspaper or watching television? 3-->nearly every day   Moving or speaking so slowly that other people could have noticed? Or the opposite - being so fidgety or restless that you have been moving around a lot more than usual? 0-->not at all   Thoughts that you would be better off dead, or of hurting yourself in some way? 0-->not at all   PHQ-9 Total Score 13   If you checked off any problems, how difficult have these problems made it for you to do your work, take care of things at home, or get along with other people? very difficult           Never smoker    I advised Francisco Javier of the risks of tobacco use.     Lab Results:   No visits with results within 3 Month(s) from this visit.   Latest known visit with results is:   Clinical Support on 04/22/2021   Component Date Value Ref Range Status   • WBC 04/22/2021 8.1  3.4 - 10.8 x10E3/uL Final   • RBC 04/22/2021 4.79  4.14 - 5.80 x10E6/uL Final   • Hemoglobin 04/22/2021 14.3  13.0 - 17.7 g/dL Final   • Hematocrit 04/22/2021 42.2  37.5 - 51.0 % Final   • MCV 04/22/2021 88  79 - 97 fL Final   • MCH 04/22/2021 29.9  26.6 - 33.0 pg Final   • MCHC 04/22/2021 33.9  31.5 - 35.7 g/dL Final   • RDW 04/22/2021 12.4  11.6 - 15.4 % Final   • Platelets 04/22/2021 473 (A) 150 - 450 x10E3/uL Final   • Neutrophil Rel % 04/22/2021 68  Not Estab. % Final   • Lymphocyte Rel % 04/22/2021 20  Not Estab. % Final   • Monocyte Rel % 04/22/2021 9  Not Estab. % Final   • Eosinophil Rel % 04/22/2021 2  Not Estab. % Final   • Basophil Rel % 04/22/2021 1  Not Estab. % Final   • Neutrophils Absolute 04/22/2021 5.6  1.4 - 7.0 x10E3/uL Final   • Lymphocytes Absolute 04/22/2021 1.6  0.7 - 3.1 x10E3/uL Final   • Monocytes Absolute 04/22/2021 0.7  0.1 - 0.9 x10E3/uL Final   • Eosinophils Absolute 04/22/2021 0.1  0.0 - 0.4 x10E3/uL Final   • Basophils Absolute 04/22/2021 0.1  0.0 - 0.2 x10E3/uL Final   • Immature Granulocyte Rel % 04/22/2021 0  Not Estab. %  Final   • Immature Grans Absolute 04/22/2021 0.0  0.0 - 0.1 x10E3/uL Final   • Glucose 04/22/2021 77  65 - 99 mg/dL Final   • BUN 04/22/2021 9  6 - 24 mg/dL Final   • Creatinine 04/22/2021 1.06  0.76 - 1.27 mg/dL Final   • eGFR Non  Am 04/22/2021 87  >59 mL/min/1.73 Final   • eGFR African Am 04/22/2021 101  >59 mL/min/1.73 Final    Comment: **[facility] currently reports eGFR in compliance with the current**    recommendations of the National Kidney Foundation. [facility] will    update reporting as new guidelines are published from the NKF-ASN    Task force.     • BUN/Creatinine Ratio 04/22/2021 8 (A) 9 - 20 Final   • Sodium 04/22/2021 142  134 - 144 mmol/L Final   • Potassium 04/22/2021 4.4  3.5 - 5.2 mmol/L Final   • Chloride 04/22/2021 101  96 - 106 mmol/L Final   • Total CO2 04/22/2021 26  20 - 29 mmol/L Final   • Calcium 04/22/2021 10.1  8.7 - 10.2 mg/dL Final   • Total Protein 04/22/2021 8.0  6.0 - 8.5 g/dL Final   • Albumin 04/22/2021 5.0  4.0 - 5.0 g/dL Final   • Globulin 04/22/2021 3.0  1.5 - 4.5 g/dL Final   • A/G Ratio 04/22/2021 1.7  1.2 - 2.2 Final   • Total Bilirubin 04/22/2021 0.4  0.0 - 1.2 mg/dL Final   • Alkaline Phosphatase 04/22/2021 101  39 - 117 IU/L Final   • AST (SGOT) 04/22/2021 25  0 - 40 IU/L Final   • ALT (SGPT) 04/22/2021 36  0 - 44 IU/L Final   • TSH 04/22/2021 1.210  0.450 - 4.500 uIU/mL Final   • 25 Hydroxy, Vitamin D 04/22/2021 95.0  30.0 - 100.0 ng/mL Final    Comment: Vitamin D deficiency has been defined by the [place] of  Medicine and an Endocrine Society practice guideline as a  level of serum 25-OH vitamin D less than 20 ng/mL (1,2).  The Endocrine Society went on to further define vitamin D  insufficiency as a level between 21 and 29 ng/mL (2).  1. IOM ([place] of [employer]). 2010. Dietary reference     intakes for calcium and D. [place]: The     National Academies Press.  2. Elver BENTON, Lebron DEJESUS, [place], et al.     Evaluation, treatment, and  prevention of vitamin D     deficiency: an Endocrine Society clinical practice     guideline. JCEM. 2011 Jul; 96(7):1911-30.     • Vitamin B-12 04/22/2021 1,047  232 - 1,245 pg/mL Final   • Folate 04/22/2021 7.0  >3.0 ng/mL Final    Comment: A serum folate concentration of less than 3.1 ng/mL is  considered to represent clinical deficiency.     • Testosterone, Total 04/22/2021 391  264 - 916 ng/dL Final    Comment: Adult male reference interval is based on a population of  healthy nonobese males (BMI <30) between 19 and 39 years old.  Charline et.al. JCEM 2017,102;1979-4403. PMID: 64784174.       **Effective May 10, 2021 Testosterone, Serum**         reference interval will be changing to:               Age                Male          Female            0 - 30 days          0 - 650        4 - 190            1 -  5 months        0 - 650        0 -  42                 6 months        0 -  36        0 -  42            7m-  1 year          0 -  36        1 -  26            2 -  5 years         0 -  36        3 -  33            6 -  8 years         0 -  36        3 -  25            9 - 10 years         0 -  21        1 -  33                11 years         1 - 161        5 -  58                12 years         2 - 521        5 -  58           13 - 15 years        28 - 656       12 -  71           16 - 17 years       150 - 785       12 -  71           18 - 19 years       150 - 785       13 -  71                                      20 - 30 years       264 - 916       13 -  71           31 - 40 years       264 - 916        8 -  60           41 - 60 years       264 - 916        4 -  50           61 - 80 years       264 - 916        3 -  67               >80 years       264 - 916        2 -  45     • Testosterone, Free 04/22/2021 5.9 (A) 6.8 - 21.5 pg/mL Final   • Vitamin B1, Whole Blood 04/22/2021 CANCELED  nmol/L Final-Edited    Comment: Test not performed. No frozen whole blood received.    Result canceled by the  ancillary.     • Specimen Status 04/22/2021 CANCELED   Final-Edited    Comment: Test not performed. No frozen whole blood received.        TEST:  [id number]  Vitamin B1 (Thiamine), Blood    Result canceled by the ancillary.         Assessment/Plan   Problems Addressed this Visit        Mental Health    Mild episode of recurrent major depressive disorder (HCC) - Primary (Chronic)    Attention deficit hyperactivity disorder, combined type (Chronic)      Diagnoses       Codes Comments    Mild episode of recurrent major depressive disorder (HCC)    -  Primary ICD-10-CM: F33.0  ICD-9-CM: 296.31     Attention deficit hyperactivity disorder, combined type     ICD-10-CM: F90.2  ICD-9-CM: 314.01           Visit Diagnoses:    ICD-10-CM ICD-9-CM   1. Mild episode of recurrent major depressive disorder (HCC)  F33.0 296.31   2. Attention deficit hyperactivity disorder, combined type  F90.2 314.01       TREATMENT PLAN/GOALS: Continue supportive psychotherapy efforts and medications as indicated. Treatment and medication options discussed during today's visit. Patient ackowledged and verbally consented to continue with current treatment plan and was educated on the importance of compliance with treatment and follow-up appointments.    MEDICATION ISSUES:  INSPECT reviewed as expected - 3/27/2022 adderall # 60     1. ADHD  Combined - cont  Adderall  20 mg TID for now , adderall XR was offered, he wanted to try immediate release first   Consider changing to adderall IR 20 mg at 5 AM, Adderall XR 20 mg at noon and adderall 20 mg at 3 PM  (around May 19)   or Mydayis      Advised not to take stimulants on weekends     2. MDD-  Consider  Therapy, will put on the list  For LCSW     3. Long term therapeutic drug monitoring - UDS 2/9/2022 consistent for adderall , also+ for ethyl glucuronide       PHQ scored 13  and indicated moderate   depression   JACLYN 7 scored 6    Discussed medication options and treatment plan of prescribed medication as  well as the risks, benefits, and side effects including potential falls, possible impaired driving and metabolic adversities among others. Patient is agreeable to call the office with any worsening of symptoms or onset of side effects. Patient is agreeable to call 911 or go to the nearest ER should he/she begin having SI/HI. No medication side effects or related complaints today.     MEDS ORDERED DURING VISIT:  No orders of the defined types were placed in this encounter.      Return in about 4 months (around 9/10/2022).         This document has been electronically signed by Maria De Jesus Quintanilla MD  May 10, 2022 08:48 EDT    EMR Dragon transcription disclaimer:  Some of this encounter note is an electronic transcription translation of spoken language to printed text. The electronic translation of spoken language may permit erroneous, or at times, nonsensical words or phrases to be inadvertently transcribed; Although I have reviewed the note for such errors some may still exist.

## 2022-05-10 NOTE — TELEPHONE ENCOUNTER
Francisco Javier forgot to mention to you that the adderall seems to intensify his smoking, making him want to smoke more. He wants to quit and wanted to ask for suggestions. Thanks

## 2022-05-24 DIAGNOSIS — F90.2 ATTENTION DEFICIT HYPERACTIVITY DISORDER, COMBINED TYPE: Chronic | ICD-10-CM

## 2022-05-25 RX ORDER — DEXTROAMPHETAMINE SACCHARATE, AMPHETAMINE ASPARTATE, DEXTROAMPHETAMINE SULFATE AND AMPHETAMINE SULFATE 5; 5; 5; 5 MG/1; MG/1; MG/1; MG/1
20 TABLET ORAL 3 TIMES DAILY
Qty: 90 TABLET | Refills: 0 | Status: SHIPPED | OUTPATIENT
Start: 2022-05-25 | End: 2022-06-23 | Stop reason: SDUPTHER

## 2022-06-23 DIAGNOSIS — F90.2 ATTENTION DEFICIT HYPERACTIVITY DISORDER, COMBINED TYPE: Chronic | ICD-10-CM

## 2022-06-23 NOTE — TELEPHONE ENCOUNTER
Rx Refill Note  Requested Prescriptions     Pending Prescriptions Disp Refills   • amphetamine-dextroamphetamine (Adderall) 20 MG tablet 90 tablet 0     Sig: Take 1 tablet by mouth 3 (Three) Times a Day.      Last office visit with prescribing clinician: 5/10/2022      Next office visit with prescribing clinician: 9/8/2022   Office Visit with Maria De Jesus Quintanilla MD (05/10/2022)       [facility] Urine Drug Screen - (02/09/2022)      Teresa Corcoran MA  06/23/22, 14:22 EDT     Inspect printed

## 2022-06-24 RX ORDER — DEXTROAMPHETAMINE SACCHARATE, AMPHETAMINE ASPARTATE, DEXTROAMPHETAMINE SULFATE AND AMPHETAMINE SULFATE 5; 5; 5; 5 MG/1; MG/1; MG/1; MG/1
20 TABLET ORAL 3 TIMES DAILY
Qty: 90 TABLET | Refills: 0 | Status: SHIPPED | OUTPATIENT
Start: 2022-06-24 | End: 2022-07-21 | Stop reason: SDUPTHER

## 2022-07-21 DIAGNOSIS — F90.2 ATTENTION DEFICIT HYPERACTIVITY DISORDER, COMBINED TYPE: Chronic | ICD-10-CM

## 2022-07-21 NOTE — TELEPHONE ENCOUNTER
Rx Refill Note  Requested Prescriptions     Pending Prescriptions Disp Refills   • amphetamine-dextroamphetamine (Adderall) 20 MG tablet 90 tablet 0     Sig: Take 1 tablet by mouth 3 (Three) Times a Day.      Last office visit with prescribing clinician: 5/10/2022      Next office visit with prescribing clinician: 9/8/2022   Office Visit with Maria De Jesus Quintanilla MD (05/10/2022)       [facility] Urine Drug Screen - (02/09/2022)      Teresa Corcoran MA  07/21/22, 14:45 EDT     INSPECT PRINTED

## 2022-07-22 RX ORDER — DEXTROAMPHETAMINE SACCHARATE, AMPHETAMINE ASPARTATE, DEXTROAMPHETAMINE SULFATE AND AMPHETAMINE SULFATE 5; 5; 5; 5 MG/1; MG/1; MG/1; MG/1
20 TABLET ORAL 3 TIMES DAILY
Qty: 90 TABLET | Refills: 0 | Status: SHIPPED | OUTPATIENT
Start: 2022-07-22 | End: 2022-08-23 | Stop reason: SDUPTHER

## 2022-08-23 DIAGNOSIS — F90.2 ATTENTION DEFICIT HYPERACTIVITY DISORDER, COMBINED TYPE: Chronic | ICD-10-CM

## 2022-08-23 RX ORDER — DEXTROAMPHETAMINE SACCHARATE, AMPHETAMINE ASPARTATE, DEXTROAMPHETAMINE SULFATE AND AMPHETAMINE SULFATE 5; 5; 5; 5 MG/1; MG/1; MG/1; MG/1
20 TABLET ORAL 3 TIMES DAILY
Qty: 90 TABLET | Refills: 0 | Status: SHIPPED | OUTPATIENT
Start: 2022-08-23 | End: 2022-09-08

## 2022-08-23 NOTE — TELEPHONE ENCOUNTER
Rx Refill Note  Requested Prescriptions     Pending Prescriptions Disp Refills   • amphetamine-dextroamphetamine (Adderall) 20 MG tablet 90 tablet 0     Sig: Take 1 tablet by mouth 3 (Three) Times a Day.      Last office visit with prescribing clinician: 5/10/2022      Next office visit with prescribing clinician: 9/8/2022   Office Visit with Maria De Jesus Quintanilla MD (05/10/2022)       [facility] Urine Drug Screen - (02/09/2022)      Teresa Corcoran MA  08/23/22, 11:50 EDT     Inspect printed

## 2022-09-08 ENCOUNTER — OFFICE VISIT (OUTPATIENT)
Dept: PSYCHIATRY | Facility: CLINIC | Age: 42
End: 2022-09-08

## 2022-09-08 DIAGNOSIS — F33.0 MILD EPISODE OF RECURRENT MAJOR DEPRESSIVE DISORDER: Primary | Chronic | ICD-10-CM

## 2022-09-08 DIAGNOSIS — F90.2 ATTENTION DEFICIT HYPERACTIVITY DISORDER, COMBINED TYPE: Chronic | ICD-10-CM

## 2022-09-08 PROCEDURE — 99213 OFFICE O/P EST LOW 20 MIN: CPT | Performed by: PSYCHIATRY & NEUROLOGY

## 2022-09-08 RX ORDER — DEXTROAMPHETAMINE SACCHARATE, AMPHETAMINE ASPARTATE, DEXTROAMPHETAMINE SULFATE AND AMPHETAMINE SULFATE 5; 5; 5; 5 MG/1; MG/1; MG/1; MG/1
20 TABLET ORAL 3 TIMES DAILY
Qty: 90 TABLET | Refills: 0 | Status: SHIPPED | OUTPATIENT
Start: 2022-09-08 | End: 2022-09-28 | Stop reason: SDUPTHER

## 2022-09-28 ENCOUNTER — TELEPHONE (OUTPATIENT)
Dept: PSYCHIATRY | Facility: CLINIC | Age: 42
End: 2022-09-28

## 2022-09-28 DIAGNOSIS — F90.2 ATTENTION DEFICIT HYPERACTIVITY DISORDER, COMBINED TYPE: Chronic | ICD-10-CM

## 2022-09-28 RX ORDER — DEXTROAMPHETAMINE SACCHARATE, AMPHETAMINE ASPARTATE, DEXTROAMPHETAMINE SULFATE AND AMPHETAMINE SULFATE 5; 5; 5; 5 MG/1; MG/1; MG/1; MG/1
20 TABLET ORAL 3 TIMES DAILY
Qty: 90 TABLET | Refills: 0 | Status: SHIPPED | OUTPATIENT
Start: 2022-09-28 | End: 2022-10-27 | Stop reason: SDUPTHER

## 2022-09-28 NOTE — TELEPHONE ENCOUNTER
Pharmacy confirmed that Adderall 20 mg was out of stock. Cancelled rx.    Pt would like new rx sent to [facility] in [place], [place].

## 2022-10-27 DIAGNOSIS — F90.2 ATTENTION DEFICIT HYPERACTIVITY DISORDER, COMBINED TYPE: Chronic | ICD-10-CM

## 2022-10-27 NOTE — TELEPHONE ENCOUNTER
Rx Refill Note  Requested Prescriptions     Pending Prescriptions Disp Refills   • amphetamine-dextroamphetamine (Adderall) 20 MG tablet 90 tablet 0     Sig: Take 1 tablet by mouth 3 (Three) Times a Day.      Last office visit with prescribing clinician: 9/8/2022      Next office visit with prescribing clinician: 1/10/2023   Office Visit with Maria De Jesus Quintanilla MD (09/08/2022)       [facility] Urine Drug Screen - (02/09/2022)      Teresa Corcoran MA  10/27/22, 11:24 EDT     Last fill 9-28; inspect printed

## 2022-10-28 RX ORDER — DEXTROAMPHETAMINE SACCHARATE, AMPHETAMINE ASPARTATE, DEXTROAMPHETAMINE SULFATE AND AMPHETAMINE SULFATE 5; 5; 5; 5 MG/1; MG/1; MG/1; MG/1
20 TABLET ORAL 3 TIMES DAILY
Qty: 90 TABLET | Refills: 0 | Status: SHIPPED | OUTPATIENT
Start: 2022-10-28 | End: 2022-11-28 | Stop reason: SDUPTHER

## 2022-11-28 DIAGNOSIS — F90.2 ATTENTION DEFICIT HYPERACTIVITY DISORDER, COMBINED TYPE: Chronic | ICD-10-CM

## 2022-11-28 NOTE — TELEPHONE ENCOUNTER
Rx Refill Note  Requested Prescriptions     Pending Prescriptions Disp Refills   • amphetamine-dextroamphetamine (Adderall) 20 MG tablet 90 tablet 0     Sig: Take 1 tablet by mouth 3 (Three) Times a Day.      Last office visit with prescribing clinician: 9/8/2022      Next office visit with prescribing clinician: 1/10/2023   Office Visit with Maria De Jesus Quintanilla MD (09/08/2022)       [facility] Urine Drug Screen - (02/09/2022)      Tersea Corcoran MA  11/28/22, 10:28 EST     Inspect printed; last fill 10/28   Speaking Coherently

## 2022-11-29 RX ORDER — DEXTROAMPHETAMINE SACCHARATE, AMPHETAMINE ASPARTATE, DEXTROAMPHETAMINE SULFATE AND AMPHETAMINE SULFATE 5; 5; 5; 5 MG/1; MG/1; MG/1; MG/1
20 TABLET ORAL 3 TIMES DAILY
Qty: 90 TABLET | Refills: 0 | Status: SHIPPED | OUTPATIENT
Start: 2022-11-29 | End: 2022-12-30 | Stop reason: SDUPTHER

## 2022-12-30 DIAGNOSIS — F90.2 ATTENTION DEFICIT HYPERACTIVITY DISORDER, COMBINED TYPE: Chronic | ICD-10-CM

## 2022-12-30 RX ORDER — DEXTROAMPHETAMINE SACCHARATE, AMPHETAMINE ASPARTATE, DEXTROAMPHETAMINE SULFATE AND AMPHETAMINE SULFATE 5; 5; 5; 5 MG/1; MG/1; MG/1; MG/1
20 TABLET ORAL 3 TIMES DAILY
Qty: 90 TABLET | Refills: 0 | Status: SHIPPED | OUTPATIENT
Start: 2022-12-30 | End: 2023-01-10 | Stop reason: SDUPTHER

## 2022-12-30 NOTE — TELEPHONE ENCOUNTER
Rx Refill Note  Requested Prescriptions     Pending Prescriptions Disp Refills   • amphetamine-dextroamphetamine (Adderall) 20 MG tablet 90 tablet 0     Sig: Take 1 tablet by mouth 3 (Three) Times a Day.      Last office visit with prescribing clinician: 9/8/2022   Last telemedicine visit with prescribing clinician: 1/10/2023   Next office visit with prescribing clinician: 1/10/2023   Office Visit with Maria De Jesus Quintanilla MD (09/08/2022)       [facility] Urine Drug Screen - (02/09/2022)                   Would you like a call back once the refill request has been completed: [] Yes [] No    If the office needs to give you a call back, can they leave a voicemail: [] Yes [] No    Teresa Corcoran MA  12/30/22, 14:35 EST     Pt says in stock; inspect printed; last fill 11-29

## 2023-01-10 ENCOUNTER — OFFICE VISIT (OUTPATIENT)
Dept: PSYCHIATRY | Facility: CLINIC | Age: 43
End: 2023-01-10
Payer: COMMERCIAL

## 2023-01-10 ENCOUNTER — TELEPHONE (OUTPATIENT)
Dept: PSYCHIATRY | Facility: CLINIC | Age: 43
End: 2023-01-10

## 2023-01-10 DIAGNOSIS — Z79.899 ENCOUNTER FOR LONG-TERM (CURRENT) USE OF OTHER MEDICATIONS: ICD-10-CM

## 2023-01-10 DIAGNOSIS — F90.2 ATTENTION DEFICIT HYPERACTIVITY DISORDER, COMBINED TYPE: Chronic | ICD-10-CM

## 2023-01-10 DIAGNOSIS — F33.0 MILD EPISODE OF RECURRENT MAJOR DEPRESSIVE DISORDER: ICD-10-CM

## 2023-01-10 DIAGNOSIS — F33.0 MILD EPISODE OF RECURRENT MAJOR DEPRESSIVE DISORDER: Primary | Chronic | ICD-10-CM

## 2023-01-10 PROCEDURE — 99214 OFFICE O/P EST MOD 30 MIN: CPT | Performed by: PSYCHIATRY & NEUROLOGY

## 2023-01-10 RX ORDER — DEXTROAMPHETAMINE SACCHARATE, AMPHETAMINE ASPARTATE, DEXTROAMPHETAMINE SULFATE AND AMPHETAMINE SULFATE 5; 5; 5; 5 MG/1; MG/1; MG/1; MG/1
20 TABLET ORAL 3 TIMES DAILY
Qty: 90 TABLET | Refills: 0 | Status: SHIPPED | OUTPATIENT
Start: 2023-01-10 | End: 2023-03-06 | Stop reason: SDUPTHER

## 2023-01-10 NOTE — PROGRESS NOTES
Subjective   Francisco Javier Chavarria is a 42 y.o. male who presents today for follow up    Chief Complaint:   Decreased concentration without meds         History of Present Illness: the pt c/o difficulties with concentration since he was at school, at that time, he was day dreaming, did not finish  HS, had trouble finishing tasks, difficult to read, his parents did not pay attention, not supporting meds.  In 11 grade he had issues with attendance, because he could not keep everything under control, was trying to do one step at a time, he was 1:1 with the teacher he was doing better.  He also had troubles with impulsivity , fidgety, had troubles due to behavior   He had the same issues at home with parents, was forgetful     Now he noticed that concentration affects his job, he works as  and he needs help with a lot of things, hard to multitask, can not stay focused on his work , he was trying to avoid activity that require concentration, he frequently takes work to his home to finish without interruptions   Concentration also affects his family life     When the pt is under pressure he is making errors, sometimes even can not spell his name     Sleep - good   Mood - overwhelmed when can not complete his task  Anxiety - increased worries about minor little things, anxiety is associated with increased tension, irritability     Denied sxs of alexa /hypomania     Today the pt reported staying busy at work, he works for [employer] now,  Concentration is better on adderall and 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    PAST PSYCHIATRIC HISTORY  Axis I  No inpt , no SI, no SAs   Axis II  Defer      PAST OUTPATIENT TREATMENT  Diagnosis treated:  Anxiety/Panic Disorder, ADD  Treatment Type:  meds   Prior Psychiatric Medications:  Prozac, zoloft, lexapro - sedation   wellbutrin - not effective  trintellix - side effects     Support Groups:  None   Sequelae Of Mental Disorder:  job disruption, social isolation, emotional distress          Interval History   minimal improvement     Side Effects  wellbutrin - more depressed       Past Medical History:  Past Medical History:   Diagnosis Date   • Allergic    • Anxiety    • Depression    • Headache        Social History:  Social History     Socioeconomic History   • Marital status: Single   Tobacco Use   • Smoking status: Never   • Smokeless tobacco: Current   Vaping Use   • Vaping Use: Never used   Substance and Sexual Activity   • Alcohol use: Yes     Alcohol/week: 4.0 - 5.0 standard drinks     Types: 4 - 5 Cans of beer per week     Comment: once per month    • Drug use: No   • Sexual activity: Defer       Family History:  Family History   Problem Relation Age of Onset   • Depression Mother    • Anxiety disorder Mother    • Other Mother    • Mental illness Mother    • Cancer Father    • Depression Sister    • Anxiety disorder Sister    • Other Sister        Past Surgical History:  No past surgical history on file.    Problem List:  Patient Active Problem List   Diagnosis   • Irritable bowel syndrome with diarrhea   • Mild episode of recurrent major depressive disorder (HCC)   • Right ear pain   • Sting of hornets, wasps, and bees causing poisoning and toxic reactions   • Physical exam   • Testosterone deficiency   • Attention deficit hyperactivity disorder, combined type       Allergy:   Allergies   Allergen Reactions   • Wasp Venom Hives     The patient states last time this happened, he has never been the same since.   • Seasonal Ic [Cholestatin] Itching     Certain things         Discontinued Medications:  Medications Discontinued During This Encounter    Medication Reason   • amphetamine-dextroamphetamine (Adderall) 20 MG tablet Reorder       Current Medications:   Current Outpatient Medications   Medication Sig Dispense Refill   • amphetamine-dextroamphetamine (Adderall) 20 MG tablet Take 1 tablet by mouth 3 (Three) Times a Day. 90 tablet 0   • dicyclomine (BENTYL) 20 MG tablet Take 1 tablet by mouth Every 6 (Six) Hours. 120 tablet 3   • gabapentin (NEURONTIN) 400 MG capsule TAKE 1 CAPSULE BY MOUTH THREE TIMES DAILY 90 capsule 0     No current facility-administered medications for this visit.         Psychological ROS: positive for - anxiety  and concentration difficulties  negative for - hallucinations, hostility, irritability, memory difficulties, mood swings, physical abuse or sexual abuse      Physical Exam:   There were no vitals taken for this visit.    Mental Status Exam:   Hygiene:   good  Cooperation:  Cooperative  Eye Contact:  Good  Psychomotor Behavior:  Appropriate  Affect:  Appropriate  Mood: fluctates  Hopelessness: Denies  Speech:  Normal  Thought Process:  Goal directed and Linear  Thought Content:  Mood congruent  Suicidal:  None  Homicidal:  None  Hallucinations:  None  Delusion:  None  Memory:  Intact  Orientation:  Person, Place, Time and Situation  Reliability:  good  Insight:  Good  Judgement:  Fair  Impulse Control:  Fair  Physical/Medical Issues:  No      MSE from 9/8/2022 reviewed and no  changes necessary     PHQ-9 Depression Screening  Little interest or pleasure in doing things? 2-->more than half the days   Feeling down, depressed, or hopeless? 2-->more than half the days   Trouble falling or staying asleep, or sleeping too much? 1-->several days   Feeling tired or having little energy? 1-->several days   Poor appetite or overeating? 0-->not at all   Feeling bad about yourself - or that you are a failure or have let yourself or your family down? 1-->several days   Trouble concentrating on things, such as reading the newspaper or  watching television? 3-->nearly every day   Moving or speaking so slowly that other people could have noticed? Or the opposite - being so fidgety or restless that you have been moving around a lot more than usual? 0-->not at all   Thoughts that you would be better off dead, or of hurting yourself in some way? 0-->not at all   PHQ-9 Total Score 10   If you checked off any problems, how difficult have these problems made it for you to do your work, take care of things at home, or get along with other people? very difficult       Francisco Javier Chavarria  reports that he has never smoked. He uses smokeless tobacco.. I have educated him on the risk of diseases from using tobacco products such as cancer, COPD and heart disease.     I advised him to quit and he is not willing to quit.    I spent 3  minutes counseling the patient.         Never smoker     I advised Francisco Javier of the risks of tobacco use.     Lab Results:   No visits with results within 3 Month(s) from this visit.   Latest known visit with results is:   Clinical Support on 04/22/2021   Component Date Value Ref Range Status   • WBC 04/22/2021 8.1  3.4 - 10.8 x10E3/uL Final   • RBC 04/22/2021 4.79  4.14 - 5.80 x10E6/uL Final   • Hemoglobin 04/22/2021 14.3  13.0 - 17.7 g/dL Final   • Hematocrit 04/22/2021 42.2  37.5 - 51.0 % Final   • MCV 04/22/2021 88  79 - 97 fL Final   • MCH 04/22/2021 29.9  26.6 - 33.0 pg Final   • MCHC 04/22/2021 33.9  31.5 - 35.7 g/dL Final   • RDW 04/22/2021 12.4  11.6 - 15.4 % Final   • Platelets 04/22/2021 473 (H)  150 - 450 x10E3/uL Final   • Neutrophil Rel % 04/22/2021 68  Not Estab. % Final   • Lymphocyte Rel % 04/22/2021 20  Not Estab. % Final   • Monocyte Rel % 04/22/2021 9  Not Estab. % Final   • Eosinophil Rel % 04/22/2021 2  Not Estab. % Final   • Basophil Rel % 04/22/2021 1  Not Estab. % Final   • Neutrophils Absolute 04/22/2021 5.6  1.4 - 7.0 x10E3/uL Final   • Lymphocytes Absolute 04/22/2021 1.6  0.7 - 3.1 x10E3/uL Final   • Monocytes  Absolute 04/22/2021 0.7  0.1 - 0.9 x10E3/uL Final   • Eosinophils Absolute 04/22/2021 0.1  0.0 - 0.4 x10E3/uL Final   • Basophils Absolute 04/22/2021 0.1  0.0 - 0.2 x10E3/uL Final   • Immature Granulocyte Rel % 04/22/2021 0  Not Estab. % Final   • Immature Grans Absolute 04/22/2021 0.0  0.0 - 0.1 x10E3/uL Final   • Glucose 04/22/2021 77  65 - 99 mg/dL Final   • BUN 04/22/2021 9  6 - 24 mg/dL Final   • Creatinine 04/22/2021 1.06  0.76 - 1.27 mg/dL Final   • eGFR Non  Am 04/22/2021 87  >59 mL/min/1.73 Final   • eGFR African Am 04/22/2021 101  >59 mL/min/1.73 Final    Comment: **[facility] currently reports eGFR in compliance with the current**    recommendations of the National Kidney Foundation. [facility] will    update reporting as new guidelines are published from the NKF-ASN    Task force.     • BUN/Creatinine Ratio 04/22/2021 8 (L)  9 - 20 Final   • Sodium 04/22/2021 142  134 - 144 mmol/L Final   • Potassium 04/22/2021 4.4  3.5 - 5.2 mmol/L Final   • Chloride 04/22/2021 101  96 - 106 mmol/L Final   • Total CO2 04/22/2021 26  20 - 29 mmol/L Final   • Calcium 04/22/2021 10.1  8.7 - 10.2 mg/dL Final   • Total Protein 04/22/2021 8.0  6.0 - 8.5 g/dL Final   • Albumin 04/22/2021 5.0  4.0 - 5.0 g/dL Final   • Globulin 04/22/2021 3.0  1.5 - 4.5 g/dL Final   • A/G Ratio 04/22/2021 1.7  1.2 - 2.2 Final   • Total Bilirubin 04/22/2021 0.4  0.0 - 1.2 mg/dL Final   • Alkaline Phosphatase 04/22/2021 101  39 - 117 IU/L Final   • AST (SGOT) 04/22/2021 25  0 - 40 IU/L Final   • ALT (SGPT) 04/22/2021 36  0 - 44 IU/L Final   • TSH 04/22/2021 1.210  0.450 - 4.500 uIU/mL Final   • 25 Hydroxy, Vitamin D 04/22/2021 95.0  30.0 - 100.0 ng/mL Final    Comment: Vitamin D deficiency has been defined by the [place] of  Medicine and an Endocrine Society practice guideline as a  level of serum 25-OH vitamin D less than 20 ng/mL (1,2).  The Endocrine Society went on to further define vitamin D  insufficiency as a level between 21 and 29  ng/mL (2).  1. IOM ([employer] of [employer]). 2010. Dietary reference     intakes for calcium and D. [place]: The     National Academies Press.  2. Elver MF, Lebron DEJESUS, Arnulfo MO, et al.     Evaluation, treatment, and prevention of vitamin D     deficiency: an Endocrine Society clinical practice     guideline. JCEM. 2011 Jul; 96(7):1911-30.     • Vitamin B-12 04/22/2021 1,047  232 - 1,245 pg/mL Final   • Folate 04/22/2021 7.0  >3.0 ng/mL Final    Comment: A serum folate concentration of less than 3.1 ng/mL is  considered to represent clinical deficiency.     • Testosterone, Total 04/22/2021 391  264 - 916 ng/dL Final    Comment: Adult male reference interval is based on a population of  healthy nonobese males (BMI <30) between 19 and 39 years old.  Charline et.al. JCEM 2017,102;5774-7079. PMID: 22325868.       **Effective May 10, 2021 Testosterone, Serum**         reference interval will be changing to:               Age                Male          Female            0 - 30 days          0 - 650        4 - 190            1 -  5 months        0 - 650        0 -  42                 6 months        0 -  36        0 -  42            7m-  1 year          0 -  36        1 -  26            2 -  5 years         0 -  36        3 -  33            6 -  8 years         0 -  36        3 -  25            9 - 10 years         0 -  21        1 -  33                11 years         1 - 161        5 -  58                12 years         2 - 521        5 -  58           13 - 15 years        28 - 656       12 -  71           16 - 17 years       150 - 785       12 -  71           18 - 19 years       150 - 785       13 -  71                                      20 - 30 years       264 - 916       13 -  71           31 - 40 years       264 - 916        8 -  60           41 - 60 years       264 - 916        4 -  50           61 - 80 years       264 - 916        3 -  67               >80 years       264 - 916        2 -   45     • Testosterone, Free 04/22/2021 5.9 (L)  6.8 - 21.5 pg/mL Final   • Vitamin B1, Whole Blood 04/22/2021 CANCELED  nmol/L Final-Edited    Comment: Test not performed. No frozen whole blood received.    Result canceled by the ancillary.     • Specimen Status 04/22/2021 CANCELED   Final-Edited    Comment: Test not performed. No frozen whole blood received.        TEST:  [id number]  Vitamin B1 (Thiamine), Blood    Result canceled by the ancillary.         Assessment/Plan   Problems Addressed this Visit        Mental Health    Mild episode of recurrent major depressive disorder (HCC) - Primary (Chronic)    Relevant Medications    amphetamine-dextroamphetamine (Adderall) 20 MG tablet    Attention deficit hyperactivity disorder, combined type (Chronic)    Relevant Medications    amphetamine-dextroamphetamine (Adderall) 20 MG tablet    Other Relevant Orders    [facility] Full Urine Drug Screen -   Other Visit Diagnoses     Encounter for long-term (current) use of other medications        Relevant Orders    [facility] Full Urine Drug Screen -      Diagnoses       Codes Comments    Mild episode of recurrent major depressive disorder (HCC)    -  Primary ICD-10-CM: F33.0  ICD-9-CM: 296.31     Attention deficit hyperactivity disorder, combined type     ICD-10-CM: F90.2  ICD-9-CM: 314.01     Encounter for long-term (current) use of other medications     ICD-10-CM: Z79.899  ICD-9-CM: V58.69           Visit Diagnoses:    ICD-10-CM ICD-9-CM   1. Mild episode of recurrent major depressive disorder (HCC)  F33.0 296.31   2. Attention deficit hyperactivity disorder, combined type  F90.2 314.01   3. Encounter for long-term (current) use of other medications  Z79.899 V58.69       TREATMENT PLAN/GOALS: Continue supportive psychotherapy efforts and medications as indicated. Treatment and medication options discussed during today's visit. Patient ackowledged and verbally consented to continue with current treatment plan and was educated on the  importance of compliance with treatment and follow-up appointments.    MEDICATION ISSUES:  INSPECT reviewed as expected - 12/30/2022 adderall # 90     1. ADHD  Combined - cont  Adderall  20 mg TID for now ,  As of now the pt still prefers to stay on current meds  , no changes necessary      Advised not to take stimulants on weekends     2. MDD-  Consider  Therapy, will put on the list  For LCSW   SSRis were offered, he declined for now since he does not have much time yet after he started [employer] job     3. Long term therapeutic drug monitoring - UDS 2/9/2022 consistent for adderall , also+ for ethyl glucuronide , will repeat today     Patient screened positive for depression based on a PHQ-9 score of 10 on 1/10/2023. Follow-up recommendations include: the pt is on stimulants for adhd .    PHQ scored 10  and indicated moderate   depression   JACLYN 7 scored 8     Discussed medication options and treatment plan of prescribed medication as well as the risks, benefits, and side effects including potential falls, possible impaired driving and metabolic adversities among others. Patient is agreeable to call the office with any worsening of symptoms or onset of side effects. Patient is agreeable to call 911 or go to the nearest ER should he/she begin having SI/HI. No medication side effects or related complaints today.     MEDS ORDERED DURING VISIT:  New Medications Ordered This Visit   Medications   • amphetamine-dextroamphetamine (Adderall) 20 MG tablet     Sig: Take 1 tablet by mouth 3 (Three) Times a Day.     Dispense:  90 tablet     Refill:  0     Please dispense on or after Jan 27, 2023       Return in about 4 months (around 5/10/2023).         This document has been electronically signed by Maria De Jesus Quintanilla MD  January 10, 2023 15:50 EST    EMR Dragon transcription disclaimer:  Some of this encounter note is an electronic transcription translation of spoken language to printed text. The electronic translation of spoken  language may permit erroneous, or at times, nonsensical words or phrases to be inadvertently transcribed; Although I have reviewed the note for such errors some may still exist.

## 2023-01-10 NOTE — TELEPHONE ENCOUNTER
Pt did his UDS today.  He wanted you to know that he has been taking an old script of Gabapentin to help him sleep at night because of the Adderall that he takes during the day.  He is asking if you can take over his script?

## 2023-01-11 RX ORDER — GABAPENTIN 400 MG/1
400 CAPSULE ORAL 3 TIMES DAILY
Qty: 90 CAPSULE | Refills: 0 | Status: SHIPPED | OUTPATIENT
Start: 2023-01-11

## 2023-03-06 DIAGNOSIS — F90.2 ATTENTION DEFICIT HYPERACTIVITY DISORDER, COMBINED TYPE: Chronic | ICD-10-CM

## 2023-03-06 NOTE — TELEPHONE ENCOUNTER
Rx Refill Note  Requested Prescriptions     Pending Prescriptions Disp Refills   • amphetamine-dextroamphetamine (Adderall) 20 MG tablet 90 tablet 0     Sig: Take 1 tablet by mouth 3 (Three) Times a Day.      Last office visit with prescribing clinician: 1/10/2023   Last telemedicine visit with prescribing clinician: 5/11/2023   Next office visit with prescribing clinician: 5/11/2023   Office Visit with Maria De Jesus Quintanilla MD (01/10/2023)       SCANNED - LABS (01/10/2023)                   Would you like a call back once the refill request has been completed: [] Yes [] No    If the office needs to give you a call back, can they leave a voicemail: [] Yes [] No    Teresa Corcoarn MA  03/06/23, 11:05 EST     Pt says he will wait for it to come in stock, because he has had the best luck at [facility]; Inspect printed; last fill 2-03; pt says he is out

## 2023-03-07 RX ORDER — DEXTROAMPHETAMINE SACCHARATE, AMPHETAMINE ASPARTATE, DEXTROAMPHETAMINE SULFATE AND AMPHETAMINE SULFATE 5; 5; 5; 5 MG/1; MG/1; MG/1; MG/1
20 TABLET ORAL 3 TIMES DAILY
Qty: 90 TABLET | Refills: 0 | Status: SHIPPED | OUTPATIENT
Start: 2023-03-07 | End: 2023-03-31 | Stop reason: SDUPTHER

## 2023-03-31 DIAGNOSIS — F90.2 ATTENTION DEFICIT HYPERACTIVITY DISORDER, COMBINED TYPE: Chronic | ICD-10-CM

## 2023-03-31 RX ORDER — DEXTROAMPHETAMINE SACCHARATE, AMPHETAMINE ASPARTATE, DEXTROAMPHETAMINE SULFATE AND AMPHETAMINE SULFATE 5; 5; 5; 5 MG/1; MG/1; MG/1; MG/1
20 TABLET ORAL 3 TIMES DAILY
Qty: 90 TABLET | Refills: 0 | Status: SHIPPED | OUTPATIENT
Start: 2023-03-31 | End: 2024-03-30

## 2023-03-31 NOTE — TELEPHONE ENCOUNTER
Received call from patient stating he needs refill on the adderall.      Rx Refill Note  Requested Prescriptions      No prescriptions requested or ordered in this encounter      Last office visit with prescribing clinician: 1/10/2023      Next office visit with prescribing clinician: 5/11/2023   Progress Notes by Maria De Jesus Quintanilla MD (01/10/2023 15:15)    Inspect - 3-7-23  {  Alia Low MA  03/31/23, 10:58 EDT

## 2023-04-04 ENCOUNTER — PRIOR AUTHORIZATION (OUTPATIENT)
Dept: PSYCHIATRY | Facility: CLINIC | Age: 43
End: 2023-04-04
Payer: MEDICAID

## 2023-04-04 NOTE — TELEPHONE ENCOUNTER
"PA for Adderall 20 mg tablets TID submitted to [facility] [place] [employer].    Response:   \"Approved today  Approved. This drug is covered on the Preferred Drug List. It does not require prior approval. You can get 90 tablets per 30 days. Please call the pharmacy to process the claim. ENTER DIAGNOSIS CODE F90.2 - Pays\"  "

## 2023-05-01 DIAGNOSIS — F90.2 ATTENTION DEFICIT HYPERACTIVITY DISORDER, COMBINED TYPE: Chronic | ICD-10-CM

## 2023-05-01 NOTE — TELEPHONE ENCOUNTER
Rx Refill Note  Requested Prescriptions     Pending Prescriptions Disp Refills   • amphetamine-dextroamphetamine (Adderall) 20 MG tablet 90 tablet 0     Sig: Take 1 tablet by mouth 3 (Three) Times a Day.      Last office visit with prescribing clinician: 1/10/2023   Last telemedicine visit with prescribing clinician: Visit date not found   Next office visit with prescribing clinician: Visit date not found                         Would you like a call back once the refill request has been completed: [] Yes [] No    If the office needs to give you a call back, can they leave a voicemail: [] Yes [] No    Teresa Corcoran MA  05/01/23, 12:40 EDT     Pt says it is easiest for him to wait for Adderall to come in stock at [facility]; Inspect printed; last fill 4-06

## 2023-05-02 RX ORDER — DEXTROAMPHETAMINE SACCHARATE, AMPHETAMINE ASPARTATE, DEXTROAMPHETAMINE SULFATE AND AMPHETAMINE SULFATE 5; 5; 5; 5 MG/1; MG/1; MG/1; MG/1
20 TABLET ORAL 3 TIMES DAILY
Qty: 90 TABLET | Refills: 0 | Status: SHIPPED | OUTPATIENT
Start: 2023-05-02 | End: 2023-05-05 | Stop reason: SDUPTHER

## 2023-05-05 ENCOUNTER — TELEPHONE (OUTPATIENT)
Dept: PSYCHIATRY | Facility: CLINIC | Age: 43
End: 2023-05-05
Payer: MEDICAID

## 2023-05-05 DIAGNOSIS — F90.2 ATTENTION DEFICIT HYPERACTIVITY DISORDER, COMBINED TYPE: Chronic | ICD-10-CM

## 2023-05-05 RX ORDER — DEXTROAMPHETAMINE SACCHARATE, AMPHETAMINE ASPARTATE, DEXTROAMPHETAMINE SULFATE AND AMPHETAMINE SULFATE 5; 5; 5; 5 MG/1; MG/1; MG/1; MG/1
20 TABLET ORAL 3 TIMES DAILY
Qty: 90 TABLET | Refills: 0 | Status: SHIPPED | OUTPATIENT
Start: 2023-05-05 | End: 2024-05-04

## 2023-05-05 NOTE — TELEPHONE ENCOUNTER
Pharmacist Mike from [facility] says she was expecting an Adderall shipment today, but it didn't come.  She cancelled the script for me.    Pt would like it sent to [facility] [place] [facility].

## 2023-06-02 DIAGNOSIS — F90.2 ATTENTION DEFICIT HYPERACTIVITY DISORDER, COMBINED TYPE: Chronic | ICD-10-CM

## 2023-06-02 NOTE — TELEPHONE ENCOUNTER
Rx Refill Note  Requested Prescriptions     Pending Prescriptions Disp Refills   • amphetamine-dextroamphetamine (Adderall) 20 MG tablet 90 tablet 0     Sig: Take 1 tablet by mouth 3 (Three) Times a Day.      Last office visit with prescribing clinician: 1/10/2023   Last telemedicine visit with prescribing clinician: Visit date not found   Next office visit with prescribing clinician: Visit date not found   Office Visit with Maria De Jesus Quintanilla MD (01/10/2023)  SCANNED - LABS (01/10/2023)                        Would you like a call back once the refill request has been completed: [] Yes [] No    If the office needs to give you a call back, can they leave a voicemail: [] Yes [] No    Teresa Corcoran MA  06/02/23, 11:39 EDT     PT SAYS [facility] HAS IT; INSPECT PRINTED; LAST FILL 5-05; TRANSFERRED PT TO MAKE AN APPT

## 2023-06-03 RX ORDER — DEXTROAMPHETAMINE SACCHARATE, AMPHETAMINE ASPARTATE, DEXTROAMPHETAMINE SULFATE AND AMPHETAMINE SULFATE 5; 5; 5; 5 MG/1; MG/1; MG/1; MG/1
20 TABLET ORAL 3 TIMES DAILY
Qty: 90 TABLET | Refills: 0 | Status: SHIPPED | OUTPATIENT
Start: 2023-06-03 | End: 2024-06-02

## 2023-08-01 DIAGNOSIS — F90.2 ATTENTION DEFICIT HYPERACTIVITY DISORDER, COMBINED TYPE: Chronic | ICD-10-CM

## 2023-08-02 RX ORDER — DEXTROAMPHETAMINE SACCHARATE, AMPHETAMINE ASPARTATE, DEXTROAMPHETAMINE SULFATE AND AMPHETAMINE SULFATE 5; 5; 5; 5 MG/1; MG/1; MG/1; MG/1
20 TABLET ORAL 3 TIMES DAILY
Qty: 90 TABLET | Refills: 0 | Status: SHIPPED | OUTPATIENT
Start: 2023-08-02 | End: 2024-08-01

## 2023-08-10 ENCOUNTER — OFFICE VISIT (OUTPATIENT)
Dept: PSYCHIATRY | Facility: CLINIC | Age: 43
End: 2023-08-10
Payer: COMMERCIAL

## 2023-08-10 DIAGNOSIS — F33.0 MILD EPISODE OF RECURRENT MAJOR DEPRESSIVE DISORDER: Primary | Chronic | ICD-10-CM

## 2023-08-10 DIAGNOSIS — F90.2 ATTENTION DEFICIT HYPERACTIVITY DISORDER, COMBINED TYPE: Chronic | ICD-10-CM

## 2023-08-10 RX ORDER — DEXTROAMPHETAMINE SACCHARATE, AMPHETAMINE ASPARTATE, DEXTROAMPHETAMINE SULFATE AND AMPHETAMINE SULFATE 5; 5; 5; 5 MG/1; MG/1; MG/1; MG/1
20 TABLET ORAL 3 TIMES DAILY
Qty: 90 TABLET | Refills: 0 | Status: SHIPPED | OUTPATIENT
Start: 2023-08-10 | End: 2024-08-09

## 2023-08-10 NOTE — PROGRESS NOTES
Subjective   Francisco Javier Chavarria is a 43 y.o. male who presents today for follow up    Chief Complaint:   depression, Decreased concentration without meds         History of Present Illness: the pt c/o difficulties with concentration since he was at school, at that time, he was day dreaming, did not finish  HS, had trouble finishing tasks, difficult to read, his parents did not pay attention, not supporting meds.  In 11 grade he had issues with attendance, because he could not keep everything under control, was trying to do one step at a time, he was 1:1 with the teacher he was doing better.  He also had troubles with impulsivity , fidgety, had troubles due to behavior   He had the same issues at home with parents, was forgetful     Now he noticed that concentration affects his job, he works as  and he needs help with a lot of things, hard to multitask, can not stay focused on his work , he was trying to avoid activity that require concentration, he frequently takes work to his home to finish without interruptions   Concentration also affects his family life     When the pt is under pressure he is making errors, sometimes even can not spell his name     Sleep - good   Mood - overwhelmed when can not complete his task  Anxiety - increased worries about minor little things, anxiety is associated with increased tension, irritability     Denied sxs of alexa /hypomania     Today the pt reported staying busy at work, 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    PAST PSYCHIATRIC HISTORY  [place]  I  No inpt , no SI, no SAs   [place] II  Defer     PAST OUTPATIENT TREATMENT  Diagnosis treated:  Anxiety/Panic Disorder, ADD  Treatment Type:  meds   Prior Psychiatric Medications:  Prozac, zoloft, lexapro - sedation   wellbutrin - not effective  trintellix - side effects     Support Groups:  None   Sequelae Of Mental Disorder:  job disruption, social isolation, emotional distress          Interval History  Concentration - worse     Side Effects  wellbutrin - more depressed       Past Medical History:  Past Medical History:   Diagnosis Date    Allergic     Anxiety     Depression     Headache        Social History:  Social History     Socioeconomic History    Marital status: Single   Tobacco Use    Smoking status: Never    Smokeless tobacco: Current   Vaping Use    Vaping Use: Never used   Substance and Sexual Activity    Alcohol use: Yes     Alcohol/week: 4.0 - 5.0 standard drinks     Types: 4 - 5 Cans of beer per week     Comment: once per month     Drug use: No    Sexual activity: Defer       Family History:  Family History   Problem Relation Age of Onset    Depression Mother     Anxiety disorder Mother     Other Mother     Mental illness Mother     Cancer Father     Depression Sister     Anxiety disorder Sister     Other Sister        Past Surgical History:  History reviewed. No pertinent surgical history.    Problem List:  Patient Active Problem List   Diagnosis    Irritable bowel syndrome with diarrhea    Mild episode of recurrent major depressive disorder    Right ear pain    Sting of hornets, wasps, and bees causing poisoning and toxic reactions    Physical exam    Testosterone deficiency    Attention deficit hyperactivity disorder, combined type       Allergy:   Allergies   Allergen Reactions    Wasp Venom Hives     The patient states last time this happened, he has never been the same since.    Seasonal Ic [Cholestatin] Itching     Certain things         Discontinued Medications:  Medications Discontinued  During This Encounter   Medication Reason    amphetamine-dextroamphetamine (Adderall) 20 MG tablet Reorder         Current Medications:   Current Outpatient Medications   Medication Sig Dispense Refill    amphetamine-dextroamphetamine (Adderall) 20 MG tablet Take 1 tablet by mouth 3 (Three) Times a Day. 90 tablet 0    dicyclomine (BENTYL) 20 MG tablet Take 1 tablet by mouth Every 6 (Six) Hours. 120 tablet 3    gabapentin (NEURONTIN) 400 MG capsule Take 1 capsule by mouth 3 (Three) Times a Day. 90 capsule 0    Viloxazine HCl  MG capsule sustained-release 24 hr Take 1 capsule by mouth Every Morning. 7 capsule 0    Viloxazine HCl  MG capsule sustained-release 24 hr Take 1 capsule by mouth Every Morning. 30 capsule 1     No current facility-administered medications for this visit.         Psychological ROS: positive for - anxiety  and concentration difficulties  negative for - hallucinations, hostility, irritability, memory difficulties, mood swings, physical abuse or sexual abuse      Physical Exam:   There were no vitals taken for this visit.    Mental Status Exam:   Hygiene:   good  Cooperation:  Cooperative  Eye Contact:  Good  Psychomotor Behavior:  Appropriate  Affect:  Appropriate  Mood: fluctates  Hopelessness: Denies  Speech:  Normal  Thought Process:  Goal directed and Linear  Thought Content:  Mood congruent  Suicidal:  None  Homicidal:  None  Hallucinations:  None  Delusion:  None  Memory:  Intact  Orientation:  Person, Place, Time and Situation  Reliability:  good  Insight:  Good  Judgement:  Fair  Impulse Control:  Fair  Physical/Medical Issues:  No      MSE from 1/10/2023  reviewed and no  changes necessary     PHQ-9 Depression Screening  Little interest or pleasure in doing things? 3-->nearly every day   Feeling down, depressed, or hopeless? 2-->more than half the days   Trouble falling or staying asleep, or sleeping too much? 0-->not at all   Feeling tired or having little energy? 2-->more  than half the days   Poor appetite or overeating? 0-->not at all   Feeling bad about yourself - or that you are a failure or have let yourself or your family down? 2-->more than half the days   Trouble concentrating on things, such as reading the newspaper or watching television? 2-->more than half the days   Moving or speaking so slowly that other people could have noticed? Or the opposite - being so fidgety or restless that you have been moving around a lot more than usual? 0-->not at all   Thoughts that you would be better off dead, or of hurting yourself in some way? 0-->not at all   PHQ-9 Total Score 11   If you checked off any problems, how difficult have these problems made it for you to do your work, take care of things at home, or get along with other people? very difficult       Francisco Javier Chavarria  reports that he has never smoked. He uses smokeless tobacco.. I have educated him on the risk of diseases from using tobacco products such as cancer, COPD and heart disease.     I advised him to quit and he is not willing to quit.    I spent 3  minutes counseling the patient.         Never smoker     I advised Francisco Javier of the risks of tobacco use.     Lab Results:   No visits with results within 3 Month(s) from this visit.   Latest known visit with results is:   Clinical Support on 04/22/2021   Component Date Value Ref Range Status    WBC 04/22/2021 8.1  3.4 - 10.8 x10E3/uL Final    RBC 04/22/2021 4.79  4.14 - 5.80 x10E6/uL Final    Hemoglobin 04/22/2021 14.3  13.0 - 17.7 g/dL Final    Hematocrit 04/22/2021 42.2  37.5 - 51.0 % Final    MCV 04/22/2021 88  79 - 97 fL Final    MCH 04/22/2021 29.9  26.6 - 33.0 pg Final    MCHC 04/22/2021 33.9  31.5 - 35.7 g/dL Final    RDW 04/22/2021 12.4  11.6 - 15.4 % Final    Platelets 04/22/2021 473 (H)  150 - 450 x10E3/uL Final    Neutrophil Rel % 04/22/2021 68  Not Estab. % Final    Lymphocyte Rel % 04/22/2021 20  Not Estab. % Final    Monocyte Rel % 04/22/2021 9  Not Estab. %  Final    Eosinophil Rel % 04/22/2021 2  Not Estab. % Final    Basophil Rel % 04/22/2021 1  Not Estab. % Final    Neutrophils Absolute 04/22/2021 5.6  1.4 - 7.0 x10E3/uL Final    Lymphocytes Absolute 04/22/2021 1.6  0.7 - 3.1 x10E3/uL Final    Monocytes Absolute 04/22/2021 0.7  0.1 - 0.9 x10E3/uL Final    Eosinophils Absolute 04/22/2021 0.1  0.0 - 0.4 x10E3/uL Final    Basophils Absolute 04/22/2021 0.1  0.0 - 0.2 x10E3/uL Final    Immature Granulocyte Rel % 04/22/2021 0  Not Estab. % Final    Immature Grans Absolute 04/22/2021 0.0  0.0 - 0.1 x10E3/uL Final    Glucose 04/22/2021 77  65 - 99 mg/dL Final    BUN 04/22/2021 9  6 - 24 mg/dL Final    Creatinine 04/22/2021 1.06  0.76 - 1.27 mg/dL Final    eGFR Non  Am 04/22/2021 87  >59 mL/min/1.73 Final    eGFR African Am 04/22/2021 101  >59 mL/min/1.73 Final    Comment: **[facility] currently reports eGFR in compliance with the current**    recommendations of the National Kidney Foundation. [facility] will    update reporting as new guidelines are published from the NKF-ASN    Task force.      BUN/Creatinine Ratio 04/22/2021 8 (L)  9 - 20 Final    Sodium 04/22/2021 142  134 - 144 mmol/L Final    Potassium 04/22/2021 4.4  3.5 - 5.2 mmol/L Final    Chloride 04/22/2021 101  96 - 106 mmol/L Final    Total CO2 04/22/2021 26  20 - 29 mmol/L Final    Calcium 04/22/2021 10.1  8.7 - 10.2 mg/dL Final    Total Protein 04/22/2021 8.0  6.0 - 8.5 g/dL Final    Albumin 04/22/2021 5.0  4.0 - 5.0 g/dL Final    Globulin 04/22/2021 3.0  1.5 - 4.5 g/dL Final    A/G Ratio 04/22/2021 1.7  1.2 - 2.2 Final    Total Bilirubin 04/22/2021 0.4  0.0 - 1.2 mg/dL Final    Alkaline Phosphatase 04/22/2021 101  39 - 117 IU/L Final    AST (SGOT) 04/22/2021 25  0 - 40 IU/L Final    ALT (SGPT) 04/22/2021 36  0 - 44 IU/L Final    TSH 04/22/2021 1.210  0.450 - 4.500 uIU/mL Final    25 Hydroxy, Vitamin D 04/22/2021 95.0  30.0 - 100.0 ng/mL Final    Comment: Vitamin D deficiency has been defined by the [place]  of  Medicine and an Endocrine Society practice guideline as a  level of serum 25-OH vitamin D less than 20 ng/mL (1,2).  The Endocrine Society went on to further define vitamin D  insufficiency as a level between 21 and 29 ng/mL (2).  1. IOM ([place] of Medicine). 2010. Dietary reference     intakes for calcium and D. [place]: The     National Academies Press.  2. Elver MF, Lebron [place], Arnulfo [place], et al.     Evaluation, treatment, and prevention of vitamin D     deficiency: an Endocrine Society clinical practice     guideline. JCEM. 2011 Jul; 96(7):1911-30.      Vitamin B-12 04/22/2021 1,047  232 - 1,245 pg/mL Final    Folate 04/22/2021 7.0  >3.0 ng/mL Final    Comment: A serum folate concentration of less than 3.1 ng/mL is  considered to represent clinical deficiency.      Testosterone, Total 04/22/2021 391  264 - 916 ng/dL Final    Comment: Adult male reference interval is based on a population of  healthy nonobese males (BMI <30) between 19 and 39 years old.  Charline et.al. JCEM 2017,102;4278-0271. PMID: 44462581.       **Effective May 10, 2021 Testosterone, Serum**         reference interval will be changing to:               Age                Male          Female            0 - 30 days          0 - 650        4 - 190            1 -  5 months        0 - 650        0 -  42                 6 months        0 -  36        0 -  42            7m-  1 year          0 -  36        1 -  26            2 -  5 years         0 -  36        3 -  33            6 -  8 years         0 -  36        3 -  25            9 - 10 years         0 -  21        1 -  33                11 years         1 - 161        5 -  58                12 years         2 - 521        5 -  58           13 - 15 years        28 - 656       12 -  71           16 - 17 years       150 - 785       12 -  71           18 - 19 years       150 - 785       13 -  71                                      20 - 30 years       264 - 916       13 -   71           31 - 40 years       264 - 916        8 -  60           41 - 60 years       264 - 916        4 -  50           61 - 80 years       264 - 916        3 -  67               >80 years       264 - 916        2 -  45      Testosterone, Free 04/22/2021 5.9 (L)  6.8 - 21.5 pg/mL Final    Vitamin B1, Whole Blood 04/22/2021 CANCELED  nmol/L Final-Edited    Comment: Test not performed. No frozen whole blood received.    Result canceled by the ancillary.      Specimen Status 04/22/2021 CANCELED   Final-Edited    Comment: Test not performed. No frozen whole blood received.        TEST:  [id number]  Vitamin B1 (Thiamine), Blood    Result canceled by the ancillary.         Assessment/Plan   Problems Addressed this Visit          Mental Health    Mild episode of recurrent major depressive disorder - Primary (Chronic)    Relevant Medications    Viloxazine HCl  MG capsule sustained-release 24 hr    Viloxazine HCl  MG capsule sustained-release 24 hr    amphetamine-dextroamphetamine (Adderall) 20 MG tablet    Attention deficit hyperactivity disorder, combined type (Chronic)    Relevant Medications    Viloxazine HCl  MG capsule sustained-release 24 hr    Viloxazine HCl  MG capsule sustained-release 24 hr    amphetamine-dextroamphetamine (Adderall) 20 MG tablet     Diagnoses         Codes Comments    Mild episode of recurrent major depressive disorder    -  Primary ICD-10-CM: F33.0  ICD-9-CM: 296.31     Attention deficit hyperactivity disorder, combined type     ICD-10-CM: F90.2  ICD-9-CM: 314.01             Visit Diagnoses:    ICD-10-CM ICD-9-CM   1. Mild episode of recurrent major depressive disorder  F33.0 296.31   2. Attention deficit hyperactivity disorder, combined type  F90.2 314.01         TREATMENT PLAN/GOALS: Continue supportive psychotherapy efforts and medications as indicated. Treatment and medication options discussed during today's visit. Patient ackowledged and verbally consented to  continue with current treatment plan and was educated on the importance of compliance with treatment and follow-up appointments.    MEDICATION ISSUES:  INSPECT reviewed as expected - 7/6/23  adderall # 90     1. ADHD  Combined - cont  Adderall  20 mg TID for now ,  As of now the pt still prefers to stay on current meds  , the pt is very anxious to change meds, other options discussed - to add qelbree 100-200 , or to change to adderall XR, or Mydayis (with longer duration )      Advised not to take stimulants on weekends     2. MDD-  Consider  Therapy, will put on the list  For LCSW   SSRis were offered, he declined for now since he does not have much time yet     3. Long term therapeutic drug monitoring - UDS 2/9/2022 consistent for adderall , also+ for ethyl glucuronide ,    1/10/23 - consistent    LABORATORY - SCAN - [employer] DRUG SCREEN, [facility], 1/10/2023 (01/10/2023)   Patient screened positive for depression based on a PHQ-9 score of 11 on 8/10/2023. Follow-up recommendations include:  the pt is on stimulants for adhd  .    PHQ scored 11  and indicated moderate   depression   JACLYN 7 scored 7     Discussed medication options and treatment plan of prescribed medication as well as the risks, benefits, and side effects including potential falls, possible impaired driving and metabolic adversities among others. Patient is agreeable to call the office with any worsening of symptoms or onset of side effects. Patient is agreeable to call 911 or go to the nearest ER should he/she begin having SI/HI. No medication side effects or related complaints today.     MEDS ORDERED DURING VISIT:  New Medications Ordered This Visit   Medications    Viloxazine HCl  MG capsule sustained-release 24 hr     Sig: Take 1 capsule by mouth Every Morning.     Dispense:  30 capsule     Refill:  1    Viloxazine HCl  MG capsule sustained-release 24 hr     Sig: Take 1 capsule by mouth Every Morning.     Dispense:  7 capsule      Refill:  0    amphetamine-dextroamphetamine (Adderall) 20 MG tablet     Sig: Take 1 tablet by mouth 3 (Three) Times a Day.     Dispense:  90 tablet     Refill:  0       Return in about 4 months (around 12/10/2023).         This document has been electronically signed by Maria De Jesus Quintanilla MD  August 10, 2023 13:52 EDT    EMR Dragon transcription disclaimer:  Some of this encounter note is an electronic transcription translation of spoken language to printed text. The electronic translation of spoken language may permit erroneous, or at times, nonsensical words or phrases to be inadvertently transcribed; Although I have reviewed the note for such errors some may still exist.

## 2023-09-05 DIAGNOSIS — F90.2 ATTENTION DEFICIT HYPERACTIVITY DISORDER, COMBINED TYPE: Chronic | ICD-10-CM

## 2023-09-05 RX ORDER — DEXTROAMPHETAMINE SACCHARATE, AMPHETAMINE ASPARTATE, DEXTROAMPHETAMINE SULFATE AND AMPHETAMINE SULFATE 5; 5; 5; 5 MG/1; MG/1; MG/1; MG/1
20 TABLET ORAL 3 TIMES DAILY
Qty: 90 TABLET | Refills: 0 | Status: SHIPPED | OUTPATIENT
Start: 2023-09-05 | End: 2024-09-04

## 2023-09-05 NOTE — TELEPHONE ENCOUNTER
Rx Refill Note  Requested Prescriptions     Pending Prescriptions Disp Refills    amphetamine-dextroamphetamine (Adderall) 20 MG tablet 90 tablet 0     Sig: Take 1 tablet by mouth 3 (Three) Times a Day.      Last office visit with prescribing clinician: 8/10/2023   Last telemedicine visit with prescribing clinician: Visit date not found   Next office visit with prescribing clinician: 12/12/2023   Office Visit with Maria De Jesus Quintanilla MD (08/10/2023)   SCANNED - LABS (01/10/2023)                     Would you like a call back once the refill request has been completed: [] Yes [] No    If the office needs to give you a call back, can they leave a voicemail: [] Yes [] No    Teresa Corcoran MA  09/05/23, 11:50 EDT    LAST FILL 8-5-23; PT WILL WAIT FOR SHIPMENT [facility]; INSPECT ON FILE

## 2023-10-06 DIAGNOSIS — F90.2 ATTENTION DEFICIT HYPERACTIVITY DISORDER, COMBINED TYPE: Chronic | ICD-10-CM

## 2023-10-06 RX ORDER — DEXTROAMPHETAMINE SACCHARATE, AMPHETAMINE ASPARTATE, DEXTROAMPHETAMINE SULFATE AND AMPHETAMINE SULFATE 5; 5; 5; 5 MG/1; MG/1; MG/1; MG/1
20 TABLET ORAL 3 TIMES DAILY
Qty: 90 TABLET | Refills: 0 | Status: SHIPPED | OUTPATIENT
Start: 2023-10-06 | End: 2024-10-05

## 2023-10-06 NOTE — TELEPHONE ENCOUNTER
Rx Refill Note  Requested Prescriptions     Pending Prescriptions Disp Refills    amphetamine-dextroamphetamine (Adderall) 20 MG tablet 90 tablet 0     Sig: Take 1 tablet by mouth 3 (Three) Times a Day.      Last office visit with prescribing clinician: 8/10/2023   Last telemedicine visit with prescribing clinician: Visit date not found   Next office visit with prescribing clinician: 12/12/2023   Office Visit with Maria De Jesus Quintanilla MD (08/10/2023)   SCANNED - LABS (01/10/2023)                     Would you like a call back once the refill request has been completed: [] Yes [] No    If the office needs to give you a call back, can they leave a voicemail: [] Yes [] No    Teresa Corcoran MA  10/06/23, 12:57 EDT    Pt will wait for shipment at [facility];  last fill 9-5-23; UPLOADED

## 2023-11-02 DIAGNOSIS — F90.2 ATTENTION DEFICIT HYPERACTIVITY DISORDER, COMBINED TYPE: Chronic | ICD-10-CM

## 2023-11-07 RX ORDER — DEXTROAMPHETAMINE SACCHARATE, AMPHETAMINE ASPARTATE, DEXTROAMPHETAMINE SULFATE AND AMPHETAMINE SULFATE 5; 5; 5; 5 MG/1; MG/1; MG/1; MG/1
20 TABLET ORAL 3 TIMES DAILY
Qty: 90 TABLET | Refills: 0 | Status: SHIPPED | OUTPATIENT
Start: 2023-11-07 | End: 2024-11-06

## 2023-12-04 DIAGNOSIS — F90.2 ATTENTION DEFICIT HYPERACTIVITY DISORDER, COMBINED TYPE: Chronic | ICD-10-CM

## 2023-12-04 NOTE — TELEPHONE ENCOUNTER
Rx Refill Note  Requested Prescriptions     Pending Prescriptions Disp Refills    amphetamine-dextroamphetamine (Adderall) 20 MG tablet 90 tablet 0     Sig: Take 1 tablet by mouth 3 (Three) Times a Day.      Last office visit with prescribing clinician: 8/10/2023   Last telemedicine visit with prescribing clinician: Visit date not found   Next office visit with prescribing clinician: 12/12/2023   Office Visit with Maria De Jesus Quintanilla MD (08/10/2023)   SCANNED - LABS (01/10/2023)                     Would you like a call back once the refill request has been completed: [] Yes [] No    If the office needs to give you a call back, can they leave a voicemail: [] Yes [] No    Teresa Corcoran MA  12/04/23, 13:31 EST    Pt will wait for shipment at [facility]; last fill 11-06-23; UPLOADED

## 2023-12-05 RX ORDER — DEXTROAMPHETAMINE SACCHARATE, AMPHETAMINE ASPARTATE, DEXTROAMPHETAMINE SULFATE AND AMPHETAMINE SULFATE 5; 5; 5; 5 MG/1; MG/1; MG/1; MG/1
20 TABLET ORAL 3 TIMES DAILY
Qty: 90 TABLET | Refills: 0 | Status: SHIPPED | OUTPATIENT
Start: 2023-12-05 | End: 2024-12-04

## 2023-12-28 NOTE — PROGRESS NOTES
"Subjective   Francisco Javier Chavarria is a 42 y.o. male who presents today for follow up    Chief Complaint:   Decreased concentration without meds , anxiety       History of Present Illness: the pt c/o difficulties with concentration since he was at school, at that time, he was day dreaming, did not finish  HS, had trouble finishing tasks, difficult to read, his parents did not pay attention, not supporting meds.  In 11 grade he had issues with attendance, because he could not keep everything under control, was trying to do one step at a time, he was 1:1 with the teacher he was doing better.  He also had troubles with impulsivity , fidgety, had troubles due to behavior   He had the same issues at home with parents, was forgetful     Now he noticed that concentration affects his job, he works as  and he needs help with a lot of things, hard to multitask, can not stay focused on his work , he was trying to avoid activity that require concentration, he frequently takes work to his home to finish without interruptions   Concentration also affects his family life     When the pt is under pressure he is making errors, sometimes even can not spell his name     Sleep - good   Mood - overwhelmed when can not complete his task  Anxiety - increased worries about minor little things, anxiety is associated with increased tension, irritability     Denied sxs of alexa /hypomania     Today the pt reported still feeling anxious, he quit his  and will start his [employer] job, he was accepted but now needs to wait for his start date, still fees   \"emotional and stressed \" when thinks about his situation  , wellbutrn was not effective  Concentration is better on adderal           The following portions of the patient's history were reviewed and updated as appropriate: allergies, current medications, past family history, past medical history, past social history, past surgical history and problem list.    PAST PSYCHIATRIC " HISTORY  Axis I  No inpt , no SI, no SAs   Axis II  Defer     PAST OUTPATIENT TREATMENT  Diagnosis treated:  Anxiety/Panic Disorder, ADD  Treatment Type:  meds   Prior Psychiatric Medications:  Prozac, zoloft, lexapro - sedation   wellbutrin - not effective  Support Groups:  None   Sequelae Of Mental Disorder:  job disruption, social isolation, emotional distress          Interval History  No changes     Side Effects  wellbutrin - more depressed       Past Medical History:  Past Medical History:   Diagnosis Date   • Allergic    • Anxiety    • Depression    • Headache        Social History:  Social History     Socioeconomic History   • Marital status: Single   Tobacco Use   • Smoking status: Never Smoker   • Smokeless tobacco: Current User   Vaping Use   • Vaping Use: Never used   Substance and Sexual Activity   • Alcohol use: Yes     Alcohol/week: 4.0 - 5.0 standard drinks     Types: 4 - 5 Cans of beer per week     Comment: once per month    • Drug use: No   • Sexual activity: Defer       Family History:  Family History   Problem Relation Age of Onset   • Depression Mother    • Anxiety disorder Mother    • Other Mother    • Mental illness Mother    • Cancer Father    • Depression Sister    • Anxiety disorder Sister    • Other Sister        Past Surgical History:  No past surgical history on file.    Problem List:  Patient Active Problem List   Diagnosis   • Irritable bowel syndrome with diarrhea   • Mild episode of recurrent major depressive disorder (HCC)   • Right ear pain   • Sting of hornets, wasps, and bees causing poisoning and toxic reactions   • Physical exam   • Testosterone deficiency   • Attention deficit hyperactivity disorder, combined type       Allergy:   Allergies   Allergen Reactions   • Wasp Venom Hives     The patient states last time this happened, he has never been the same since.   • Seasonal Ic [Cholestatin] Itching     Certain things         Discontinued Medications:  Medications Discontinued  During This Encounter   Medication Reason   • amphetamine-dextroamphetamine (Adderall) 20 MG tablet        Current Medications:   Current Outpatient Medications   Medication Sig Dispense Refill   • amphetamine-dextroamphetamine (Adderall) 20 MG tablet Take 1 tablet by mouth 3 (Three) Times a Day. 90 tablet 0   • dicyclomine (BENTYL) 20 MG tablet Take 1 tablet by mouth Every 6 (Six) Hours. 120 tablet 3   • gabapentin (NEURONTIN) 400 MG capsule TAKE 1 CAPSULE BY MOUTH THREE TIMES DAILY 90 capsule 0     No current facility-administered medications for this visit.         Psychological ROS: positive for - anxiety  and concentration difficulties  negative for - hallucinations, hostility, irritability, memory difficulties, mood swings, physical abuse or sexual abuse      Physical Exam:   There were no vitals taken for this visit.    Mental Status Exam:   Hygiene:   good  Cooperation:  Cooperative  Eye Contact:  Good  Psychomotor Behavior:  Appropriate  Affect:  Appropriate  Mood: fluctates  Hopelessness: Denies  Speech:  Normal  Thought Process:  Goal directed and Linear  Thought Content:  Mood congruent  Suicidal:  None  Homicidal:  None  Hallucinations:  None  Delusion:  None  Memory:  Intact  Orientation:  Person, Place, Time and Situation  Reliability:  good  Insight:  Good  Judgement:  Fair  Impulse Control:  Fair  Physical/Medical Issues:  No      MSE from 5/10/2022 reviewed and no  changes necessary     PHQ-9 Depression Screening  Little interest or pleasure in doing things? 1-->several days   Feeling down, depressed, or hopeless? 1-->several days   Trouble falling or staying asleep, or sleeping too much? 1-->several days   Feeling tired or having little energy? 1-->several days   Poor appetite or overeating? 0-->not at all   Feeling bad about yourself - or that you are a failure or have let yourself or your family down? 2-->more than half the days   Trouble concentrating on things, such as reading the newspaper or  watching television? 2-->more than half the days   Moving or speaking so slowly that other people could have noticed? Or the opposite - being so fidgety or restless that you have been moving around a lot more than usual? 0-->not at all   Thoughts that you would be better off dead, or of hurting yourself in some way? 0-->not at all   PHQ-9 Total Score 8   If you checked off any problems, how difficult have these problems made it for you to do your work, take care of things at home, or get along with other people? somewhat difficult       Francisco Javier Chavarria  reports that he has never smoked. He uses smokeless tobacco.. I have educated him on the risk of diseases from using tobacco products such as cancer, COPD and heart disease.     I advised him to quit and he is not willing to quit.    I spent 3  minutes counseling the patient.         Never smoker     I advised Francisco Javier of the risks of tobacco use.     Lab Results:   No visits with results within 3 Month(s) from this visit.   Latest known visit with results is:   Clinical Support on 04/22/2021   Component Date Value Ref Range Status   • WBC 04/22/2021 8.1  3.4 - 10.8 x10E3/uL Final   • RBC 04/22/2021 4.79  4.14 - 5.80 x10E6/uL Final   • Hemoglobin 04/22/2021 14.3  13.0 - 17.7 g/dL Final   • Hematocrit 04/22/2021 42.2  37.5 - 51.0 % Final   • MCV 04/22/2021 88  79 - 97 fL Final   • MCH 04/22/2021 29.9  26.6 - 33.0 pg Final   • MCHC 04/22/2021 33.9  31.5 - 35.7 g/dL Final   • RDW 04/22/2021 12.4  11.6 - 15.4 % Final   • Platelets 04/22/2021 473 (A) 150 - 450 x10E3/uL Final   • Neutrophil Rel % 04/22/2021 68  Not Estab. % Final   • Lymphocyte Rel % 04/22/2021 20  Not Estab. % Final   • Monocyte Rel % 04/22/2021 9  Not Estab. % Final   • Eosinophil Rel % 04/22/2021 2  Not Estab. % Final   • Basophil Rel % 04/22/2021 1  Not Estab. % Final   • Neutrophils Absolute 04/22/2021 5.6  1.4 - 7.0 x10E3/uL Final   • Lymphocytes Absolute 04/22/2021 1.6  0.7 - 3.1 x10E3/uL Final   •  Monocytes Absolute 04/22/2021 0.7  0.1 - 0.9 x10E3/uL Final   • Eosinophils Absolute 04/22/2021 0.1  0.0 - 0.4 x10E3/uL Final   • Basophils Absolute 04/22/2021 0.1  0.0 - 0.2 x10E3/uL Final   • Immature Granulocyte Rel % 04/22/2021 0  Not Estab. % Final   • Immature Grans Absolute 04/22/2021 0.0  0.0 - 0.1 x10E3/uL Final   • Glucose 04/22/2021 77  65 - 99 mg/dL Final   • BUN 04/22/2021 9  6 - 24 mg/dL Final   • Creatinine 04/22/2021 1.06  0.76 - 1.27 mg/dL Final   • eGFR Non  Am 04/22/2021 87  >59 mL/min/1.73 Final   • eGFR African Am 04/22/2021 101  >59 mL/min/1.73 Final    Comment: **[facility] currently reports eGFR in compliance with the current**    recommendations of the National Kidney Foundation. [facility] will    update reporting as new guidelines are published from the NKF-ASN    Task force.     • BUN/Creatinine Ratio 04/22/2021 8 (A) 9 - 20 Final   • Sodium 04/22/2021 142  134 - 144 mmol/L Final   • Potassium 04/22/2021 4.4  3.5 - 5.2 mmol/L Final   • Chloride 04/22/2021 101  96 - 106 mmol/L Final   • Total CO2 04/22/2021 26  20 - 29 mmol/L Final   • Calcium 04/22/2021 10.1  8.7 - 10.2 mg/dL Final   • Total Protein 04/22/2021 8.0  6.0 - 8.5 g/dL Final   • Albumin 04/22/2021 5.0  4.0 - 5.0 g/dL Final   • Globulin 04/22/2021 3.0  1.5 - 4.5 g/dL Final   • A/G Ratio 04/22/2021 1.7  1.2 - 2.2 Final   • Total Bilirubin 04/22/2021 0.4  0.0 - 1.2 mg/dL Final   • Alkaline Phosphatase 04/22/2021 101  39 - 117 IU/L Final   • AST (SGOT) 04/22/2021 25  0 - 40 IU/L Final   • ALT (SGPT) 04/22/2021 36  0 - 44 IU/L Final   • TSH 04/22/2021 1.210  0.450 - 4.500 uIU/mL Final   • 25 Hydroxy, Vitamin D 04/22/2021 95.0  30.0 - 100.0 ng/mL Final    Comment: Vitamin D deficiency has been defined by the [place] of  Medicine and an Endocrine Society practice guideline as a  level of serum 25-OH vitamin D less than 20 ng/mL (1,2).  The Endocrine Society went on to further define vitamin D  insufficiency as a level between 21  and 29 ng/mL (2).  1. IOM ([place] of Medicine). 2010. Dietary reference     intakes for calcium and D. [place]: The     National Academies Press.  2. Elver MF, Lebron DEJESUS, Arnulfo MO, et al.     Evaluation, treatment, and prevention of vitamin D     deficiency: an Endocrine Society clinical practice     guideline. JCEM. 2011 Jul; 96(7):1911-30.     • Vitamin B-12 04/22/2021 1,047  232 - 1,245 pg/mL Final   • Folate 04/22/2021 7.0  >3.0 ng/mL Final    Comment: A serum folate concentration of less than 3.1 ng/mL is  considered to represent clinical deficiency.     • Testosterone, Total 04/22/2021 391  264 - 916 ng/dL Final    Comment: Adult male reference interval is based on a population of  healthy nonobese males (BMI <30) between 19 and 39 years old.  Charline, et.al. JCEM 2017,102;7075-8821. PMID: 46786467.       **Effective May 10, 2021 Testosterone, Serum**         reference interval will be changing to:               Age                Male          Female            0 - 30 days          0 - 650        4 - 190            1 -  5 months        0 - 650        0 -  42                 6 months        0 -  36        0 -  42            7m-  1 year          0 -  36        1 -  26            2 -  5 years         0 -  36        3 -  33            6 -  8 years         0 -  36        3 -  25            9 - 10 years         0 -  21        1 -  33                11 years         1 - 161        5 -  58                12 years         2 - 521        5 -  58           13 - 15 years        28 - 656       12 -  71           16 - 17 years       150 - 785       12 -  71           18 - 19 years       150 - 785       13 -  71                                      20 - 30 years       264 - 916       13 -  71           31 - 40 years       264 - 916        8 -  60           41 - 60 years       264 - 916        4 -  50           61 - 80 years       264 - 916        3 -  67               >80 years       264 - 916         2 -  45     • Testosterone, Free 04/22/2021 5.9 (A) 6.8 - 21.5 pg/mL Final   • Vitamin B1, Whole Blood 04/22/2021 CANCELED  nmol/L Final-Edited    Comment: Test not performed. No frozen whole blood received.    Result canceled by the ancillary.     • Specimen Status 04/22/2021 CANCELED   Final-Edited    Comment: Test not performed. No frozen whole blood received.        TEST:  [id number]  Vitamin B1 (Thiamine), Blood    Result canceled by the ancillary.         Assessment/Plan   Problems Addressed this Visit        Mental Health    Mild episode of recurrent major depressive disorder (HCC) - Primary (Chronic)    Relevant Medications    amphetamine-dextroamphetamine (Adderall) 20 MG tablet    Attention deficit hyperactivity disorder, combined type (Chronic)    Relevant Medications    amphetamine-dextroamphetamine (Adderall) 20 MG tablet      Diagnoses       Codes Comments    Mild episode of recurrent major depressive disorder (HCC)    -  Primary ICD-10-CM: F33.0  ICD-9-CM: 296.31     Attention deficit hyperactivity disorder, combined type     ICD-10-CM: F90.2  ICD-9-CM: 314.01           Visit Diagnoses:    ICD-10-CM ICD-9-CM   1. Mild episode of recurrent major depressive disorder (HCC)  F33.0 296.31   2. Attention deficit hyperactivity disorder, combined type  F90.2 314.01       TREATMENT PLAN/GOALS: Continue supportive psychotherapy efforts and medications as indicated. Treatment and medication options discussed during today's visit. Patient ackowledged and verbally consented to continue with current treatment plan and was educated on the importance of compliance with treatment and follow-up appointments.    MEDICATION ISSUES:  INSPECT reviewed as expected - 8/23/2022 adderall # 90     1. ADHD  Combined - cont  Adderall  20 mg TID for now , adderall XR was offered, he wanted to try immediate release first   Consider changing to adderall IR 20 mg at 5 AM, Adderall XR 20 mg at noon and adderall 20 mg at 3 PM  (around May  19)   or Mydayis  As of now the pt still prefers to stay on current meds       Advised not to take stimulants on weekends     2. MDD-  Consider  Therapy, will put on the list  For LCSW   SSRis were offered, he declined   3. Long term therapeutic drug monitoring - UDS 2/9/2022 consistent for adderall , also+ for ethyl glucuronide     Patient screened positive for depression based on a PHQ-9 score of 8 on 9/8/2022. Follow-up recommendations include: the pt is on stimulants for adhd .    PHQ scored 8  and indicated moderate   depression   JACLYN 7 scored 10    Discussed medication options and treatment plan of prescribed medication as well as the risks, benefits, and side effects including potential falls, possible impaired driving and metabolic adversities among others. Patient is agreeable to call the office with any worsening of symptoms or onset of side effects. Patient is agreeable to call 911 or go to the nearest ER should he/she begin having SI/HI. No medication side effects or related complaints today.     MEDS ORDERED DURING VISIT:  New Medications Ordered This Visit   Medications   • amphetamine-dextroamphetamine (Adderall) 20 MG tablet     Sig: Take 1 tablet by mouth 3 (Three) Times a Day.     Dispense:  90 tablet     Refill:  0     Please dispense on or after Sept 20, 2022       Return in about 4 months (around 1/8/2023).         This document has been electronically signed by Maria De Jesus Quintanilla MD  September 8, 2022 08:22 EDT    EMR Dragon transcription disclaimer:  Some of this encounter note is an electronic transcription translation of spoken language to printed text. The electronic translation of spoken language may permit erroneous, or at times, nonsensical words or phrases to be inadvertently transcribed; Although I have reviewed the note for such errors some may still exist.    no chills/no decreased eating/drinking/no tingling

## 2024-01-12 ENCOUNTER — PRIOR AUTHORIZATION (OUTPATIENT)
Dept: PSYCHIATRY | Facility: CLINIC | Age: 44
End: 2024-01-12

## 2024-01-12 ENCOUNTER — OFFICE VISIT (OUTPATIENT)
Dept: PSYCHIATRY | Facility: CLINIC | Age: 44
End: 2024-01-12
Payer: COMMERCIAL

## 2024-01-12 DIAGNOSIS — F90.2 ATTENTION DEFICIT HYPERACTIVITY DISORDER, COMBINED TYPE: Chronic | ICD-10-CM

## 2024-01-12 DIAGNOSIS — Z79.899 ENCOUNTER FOR LONG-TERM (CURRENT) USE OF OTHER MEDICATIONS: ICD-10-CM

## 2024-01-12 DIAGNOSIS — F33.0 MILD EPISODE OF RECURRENT MAJOR DEPRESSIVE DISORDER: Primary | Chronic | ICD-10-CM

## 2024-01-12 RX ORDER — DEXTROAMPHETAMINE SACCHARATE, AMPHETAMINE ASPARTATE, DEXTROAMPHETAMINE SULFATE AND AMPHETAMINE SULFATE 5; 5; 5; 5 MG/1; MG/1; MG/1; MG/1
20 TABLET ORAL 3 TIMES DAILY
Qty: 90 TABLET | Refills: 0 | Status: SHIPPED | OUTPATIENT
Start: 2024-01-12 | End: 2025-01-11

## 2024-01-12 RX ORDER — GABAPENTIN 400 MG/1
400 CAPSULE ORAL 3 TIMES DAILY
Qty: 90 CAPSULE | Refills: 1 | Status: SHIPPED | OUTPATIENT
Start: 2024-01-12

## 2024-01-12 NOTE — TELEPHONE ENCOUNTER
PA for gabapentin 400 mg capsules TID submitted to [facility] Medicaid       Pharmacy msg: PLAN LIMIT EXCEEDED For [facility] Coupon Price of: $26.08    Approved until 3-12-24.

## 2024-01-12 NOTE — PROGRESS NOTES
Subjective   Francisco Javier Chavarria is a 43 y.o. male who presents today for follow up    Chief Complaint:    decreased concentration         History of Present Illness: the pt c/o difficulties with concentration since he was at school, at that time, he was day dreaming, did not finish  HS, had trouble finishing tasks, difficult to read, his parents did not pay attention, not supporting meds.  In 11 grade he had issues with attendance, because he could not keep everything under control, was trying to do one step at a time, he was 1:1 with the teacher he was doing better.  He also had troubles with impulsivity , fidgety, had troubles due to behavior   He had the same issues at home with parents, was forgetful     Now he noticed that concentration affects his job, he works as  and he needs help with a lot of things, hard to multitask, can not stay focused on his work , he was trying to avoid activity that require concentration, he frequently takes work to his home to finish without interruptions   Concentration also affects his family life     When the pt is under pressure he is making errors, sometimes even can not spell his name     Sleep - good   Mood - overwhelmed when can not complete his task  Anxiety - increased worries about minor little things, anxiety is associated with increased tension, irritability     Denied sxs of aleax /hypomania     Today the pt reported staying busy at work, 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    PAST PSYCHIATRIC HISTORY  Axis I  No inpt , no SI, no SAs   Axis II  Defer     PAST OUTPATIENT TREATMENT  Diagnosis treated:  Anxiety/Panic Disorder, ADD  Treatment Type:  meds   Prior Psychiatric Medications:  Prozac, zoloft, lexapro - sedation   wellbutrin - not effective  trintellix - side effects     Support Groups:  None   Sequelae Of Mental Disorder:  job disruption, social isolation, emotional distress          Interval History  No changes      Side Effects  wellbutrin - more depressed       Past Medical History:  Past Medical History:   Diagnosis Date    Allergic     Anxiety     Depression     Headache        Social History:  Social History     Socioeconomic History    Marital status: Single   Tobacco Use    Smoking status: Some Days     Types: Cigarettes    Smokeless tobacco: Current   Vaping Use    Vaping Use: Never used   Substance and Sexual Activity    Alcohol use: Yes     Alcohol/week: 4.0 - 5.0 standard drinks of alcohol     Types: 4 - 5 Cans of beer per week     Comment: once per month     Drug use: No    Sexual activity: Defer       Family History:  Family History   Problem Relation Age of Onset    Depression Mother     Anxiety disorder Mother     Other Mother     Mental illness Mother     Cancer Father     Depression Sister     Anxiety disorder Sister     Other Sister        Past Surgical History:  History reviewed. No pertinent surgical history.    Problem List:  Patient Active Problem List   Diagnosis    Irritable bowel syndrome with diarrhea    Mild episode of recurrent major depressive disorder    Right ear pain    Sting of hornets, wasps, and bees causing poisoning and toxic reactions    Physical exam    Testosterone deficiency    Attention deficit hyperactivity disorder, combined type       Allergy:   Allergies   Allergen Reactions    Wasp Venom Hives     The patient states last time this happened, he has never been the same since.    Seasonal Ic [Cholestatin] Itching     Certain  things         Discontinued Medications:  Medications Discontinued During This Encounter   Medication Reason    Viloxazine HCl  MG capsule sustained-release 24 hr Not Efficacious    Viloxazine HCl  MG capsule sustained-release 24 hr Not Efficacious    gabapentin (NEURONTIN) 400 MG capsule Reorder    amphetamine-dextroamphetamine (Adderall) 20 MG tablet Reorder           Current Medications:   Current Outpatient Medications   Medication Sig Dispense Refill    amphetamine-dextroamphetamine (Adderall) 20 MG tablet Take 1 tablet by mouth 3 (Three) Times a Day. 90 tablet 0    gabapentin (NEURONTIN) 400 MG capsule Take 1 capsule by mouth 3 (Three) Times a Day. 90 capsule 1    dicyclomine (BENTYL) 20 MG tablet Take 1 tablet by mouth Every 6 (Six) Hours. 120 tablet 3     No current facility-administered medications for this visit.         Psychological ROS: positive for - anxiety  and concentration difficulties  negative for - hallucinations, hostility, irritability, memory difficulties, mood swings, physical abuse or sexual abuse      Physical Exam:   There were no vitals taken for this visit.    Mental Status Exam:   Hygiene:   good  Cooperation:  Cooperative  Eye Contact:  Good  Psychomotor Behavior:  Appropriate  Affect:  Appropriate  Mood: fluctates  Hopelessness: Denies  Speech:  Normal  Thought Process:  Goal directed and Linear  Thought Content:  Mood congruent  Suicidal:  None  Homicidal:  None  Hallucinations:  None  Delusion:  None  Memory:  Intact  Orientation:  Person, Place, Time and Situation  Reliability:  good  Insight:  Good  Judgement:  Fair  Impulse Control:  Fair  Physical/Medical Issues:  No      MSE from 8/10/2023  reviewed and no  changes necessary     PHQ-9 Depression Screening  Little interest or pleasure in doing things? 2-->more than half the days   Feeling down, depressed, or hopeless? 1-->several days   Trouble falling or staying asleep, or sleeping too much? 1-->several days    Feeling tired or having little energy? 1-->several days   Poor appetite or overeating? 0-->not at all   Feeling bad about yourself - or that you are a failure or have let yourself or your family down? 1-->several days   Trouble concentrating on things, such as reading the newspaper or watching television? 2-->more than half the days   Moving or speaking so slowly that other people could have noticed? Or the opposite - being so fidgety or restless that you have been moving around a lot more than usual? 0-->not at all   Thoughts that you would be better off dead, or of hurting yourself in some way? 0-->not at all   PHQ-9 Total Score 8   If you checked off any problems, how difficult have these problems made it for you to do your work, take care of things at home, or get along with other people? very difficult       Francisco Javier Chavarria  reports that he has been smoking cigarettes. He uses smokeless tobacco.. I have educated him on the risk of diseases from using tobacco products such as cancer, COPD and heart disease.     I advised him to quit and he is not willing to quit.    I spent 3  minutes counseling the patient.         Never smoker     I advised Francisco Javier of the risks of tobacco use.     Lab Results:   No visits with results within 3 Month(s) from this visit.   Latest known visit with results is:   Clinical Support on 04/22/2021   Component Date Value Ref Range Status    WBC 04/22/2021 8.1  3.4 - 10.8 x10E3/uL Final    RBC 04/22/2021 4.79  4.14 - 5.80 x10E6/uL Final    Hemoglobin 04/22/2021 14.3  13.0 - 17.7 g/dL Final    Hematocrit 04/22/2021 42.2  37.5 - 51.0 % Final    MCV 04/22/2021 88  79 - 97 fL Final    MCH 04/22/2021 29.9  26.6 - 33.0 pg Final    MCHC 04/22/2021 33.9  31.5 - 35.7 g/dL Final    RDW 04/22/2021 12.4  11.6 - 15.4 % Final    Platelets 04/22/2021 473 (H)  150 - 450 x10E3/uL Final    Neutrophil Rel % 04/22/2021 68  Not Estab. % Final    Lymphocyte Rel % 04/22/2021 20  Not Estab. % Final     Monocyte Rel % 04/22/2021 9  Not Estab. % Final    Eosinophil Rel % 04/22/2021 2  Not Estab. % Final    Basophil Rel % 04/22/2021 1  Not Estab. % Final    Neutrophils Absolute 04/22/2021 5.6  1.4 - 7.0 x10E3/uL Final    Lymphocytes Absolute 04/22/2021 1.6  0.7 - 3.1 x10E3/uL Final    Monocytes Absolute 04/22/2021 0.7  0.1 - 0.9 x10E3/uL Final    Eosinophils Absolute 04/22/2021 0.1  0.0 - 0.4 x10E3/uL Final    Basophils Absolute 04/22/2021 0.1  0.0 - 0.2 x10E3/uL Final    Immature Granulocyte Rel % 04/22/2021 0  Not Estab. % Final    Immature Grans Absolute 04/22/2021 0.0  0.0 - 0.1 x10E3/uL Final    Glucose 04/22/2021 77  65 - 99 mg/dL Final    BUN 04/22/2021 9  6 - 24 mg/dL Final    Creatinine 04/22/2021 1.06  0.76 - 1.27 mg/dL Final    eGFR Non  Am 04/22/2021 87  >59 mL/min/1.73 Final    eGFR African Am 04/22/2021 101  >59 mL/min/1.73 Final    Comment: **[facility] currently reports eGFR in compliance with the current**    recommendations of the National Kidney Foundation. [facility] will    update reporting as new guidelines are published from the NKF-ASN    Task force.      BUN/Creatinine Ratio 04/22/2021 8 (L)  9 - 20 Final    Sodium 04/22/2021 142  134 - 144 mmol/L Final    Potassium 04/22/2021 4.4  3.5 - 5.2 mmol/L Final    Chloride 04/22/2021 101  96 - 106 mmol/L Final    Total CO2 04/22/2021 26  20 - 29 mmol/L Final    Calcium 04/22/2021 10.1  8.7 - 10.2 mg/dL Final    Total Protein 04/22/2021 8.0  6.0 - 8.5 g/dL Final    Albumin 04/22/2021 5.0  4.0 - 5.0 g/dL Final    Globulin 04/22/2021 3.0  1.5 - 4.5 g/dL Final    A/G Ratio 04/22/2021 1.7  1.2 - 2.2 Final    Total Bilirubin 04/22/2021 0.4  0.0 - 1.2 mg/dL Final    Alkaline Phosphatase 04/22/2021 101  39 - 117 IU/L Final    AST (SGOT) 04/22/2021 25  0 - 40 IU/L Final    ALT (SGPT) 04/22/2021 36  0 - 44 IU/L Final    TSH 04/22/2021 1.210  0.450 - 4.500 uIU/mL Final    25 Hydroxy, Vitamin D 04/22/2021 95.0  30.0 - 100.0 ng/mL Final    Comment: Vitamin D  deficiency has been defined by the [place] of  Medicine and an Endocrine Society practice guideline as a  level of serum 25-OH vitamin D less than 20 ng/mL (1,2).  The Endocrine Society went on to further define vitamin D  insufficiency as a level between 21 and 29 ng/mL (2).  1. IOM ([place] of Medicine). 2010. Dietary reference     intakes for calcium and D. [place]: The     National Academies Press.  2. Elver MF, Lebron [place], Arnulfo [place], et al.     Evaluation, treatment, and prevention of vitamin D     deficiency: an Endocrine Society clinical practice     guideline. JCEM. 2011 Jul; 96(7):1911-30.      Vitamin B-12 04/22/2021 1,047  232 - 1,245 pg/mL Final    Folate 04/22/2021 7.0  >3.0 ng/mL Final    Comment: A serum folate concentration of less than 3.1 ng/mL is  considered to represent clinical deficiency.      Testosterone, Total 04/22/2021 391  264 - 916 ng/dL Final    Comment: Adult male reference interval is based on a population of  healthy nonobese males (BMI <30) between 19 and 39 years old.  Charline et.al. JCEM 2017,102;7032-0302. PMID: 98443933.       **Effective May 10, 2021 Testosterone, Serum**         reference interval will be changing to:               Age                Male          Female            0 - 30 days          0 - 650        4 - 190            1 -  5 months        0 - 650        0 -  42                 6 months        0 -  36        0 -  42            7m-  1 year          0 -  36        1 -  26            2 -  5 years         0 -  36        3 -  33            6 -  8 years         0 -  36        3 -  25            9 - 10 years         0 -  21        1 -  33                11 years         1 - 161        5 -  58                12 years         2 - 521        5 -  58           13 - 15 years        28 - 656       12 -  71           16 - 17 years       150 - 785       12 -  71           18 - 19 years       150 - 785       13 -  71                                       20 - 30 years       264 - 916       13 -  71           31 - 40 years       264 - 916        8 -  60           41 - 60 years       264 - 916        4 -  50           61 - 80 years       264 - 916        3 -  67               >80 years       264 - 916        2 -  45      Testosterone, Free 04/22/2021 5.9 (L)  6.8 - 21.5 pg/mL Final    Vitamin B1, Whole Blood 04/22/2021 CANCELED  nmol/L Final-Edited    Comment: Test not performed. No frozen whole blood received.    Result canceled by the ancillary.      Specimen Status 04/22/2021 CANCELED   Final-Edited    Comment: Test not performed. No frozen whole blood received.        TEST:  [id number]  Vitamin B1 (Thiamine), Blood    Result canceled by the ancillary.         Assessment/Plan   Problems Addressed this Visit       Mild episode of recurrent major depressive disorder - Primary (Chronic)    Relevant Medications    gabapentin (NEURONTIN) 400 MG capsule    amphetamine-dextroamphetamine (Adderall) 20 MG tablet    Other Relevant Orders    [facility] Abbreviated Urine Drug Screen -    Attention deficit hyperactivity disorder, combined type (Chronic)    Relevant Medications    amphetamine-dextroamphetamine (Adderall) 20 MG tablet    Other Relevant Orders    [facility] Abbreviated Urine Drug Screen -     Other Visit Diagnoses       Encounter for long-term (current) use of other medications        Relevant Orders    [facility] Abbreviated Urine Drug Screen -          Diagnoses         Codes Comments    Mild episode of recurrent major depressive disorder    -  Primary ICD-10-CM: F33.0  ICD-9-CM: 296.31     Attention deficit hyperactivity disorder, combined type     ICD-10-CM: F90.2  ICD-9-CM: 314.01     Encounter for long-term (current) use of other medications     ICD-10-CM: Z79.899  ICD-9-CM: V58.69             Visit Diagnoses:    ICD-10-CM ICD-9-CM   1. Mild episode of recurrent major depressive disorder  F33.0 296.31   2. Attention deficit hyperactivity disorder, combined type  F90.2  314.01   3. Encounter for long-term (current) use of other medications  Z79.899 V58.69           TREATMENT PLAN/GOALS: Continue supportive psychotherapy efforts and medications as indicated. Treatment and medication options discussed during today's visit. Patient ackowledged and verbally consented to continue with current treatment plan and was educated on the importance of compliance with treatment and follow-up appointments.    MEDICATION ISSUES:  INSPECT reviewed as expected - 12/6/23  adderall # 90     1. ADHD  Combined - cont  Adderall  20 mg TID for now  , no changes    Advised not to take stimulants on weekends     2. MDD-  Consider  Therapy, will put on the list  For LCSW   SSRis were offered, he declined for now since he does not have much time yet     3. Long term therapeutic drug monitoring - UDS 2/9/2022 consistent for adderall , also+ for ethyl glucuronide ,    1/10/23 - consistent , will repeat today    LABORATORY - SCAN - [facility]UG SCREEN, [facility], 1/10/2023 (01/10/2023)       Patient screened positive for depression based on a PHQ-9 score of 8 on 1/12/2024. Follow-up recommendations include:  the pt is on stimulants for adhd  .    PHQ scored 8  and indicated moderate   depression   JACLYN 7 scored 4     Discussed medication options and treatment plan of prescribed medication as well as the risks, benefits, and side effects including potential falls, possible impaired driving and metabolic adversities among others. Patient is agreeable to call the office with any worsening of symptoms or onset of side effects. Patient is agreeable to call 911 or go to the nearest ER should he/she begin having SI/HI. No medication side effects or related complaints today.     MEDS ORDERED DURING VISIT:  New Medications Ordered This Visit   Medications    gabapentin (NEURONTIN) 400 MG capsule     Sig: Take 1 capsule by mouth 3 (Three) Times a Day.     Dispense:  90 capsule     Refill:  1     amphetamine-dextroamphetamine (Adderall) 20 MG tablet     Sig: Take 1 tablet by mouth 3 (Three) Times a Day.     Dispense:  90 tablet     Refill:  0       Return in about 4 months (around 5/12/2024).         This document has been electronically signed by Maria De Jesus Quintanilla MD  January 12, 2024 11:55 EST    EMR Dragon transcription disclaimer:  Some of this encounter note is an electronic transcription translation of spoken language to printed text. The electronic translation of spoken language may permit erroneous, or at times, nonsensical words or phrases to be inadvertently transcribed; Although I have reviewed the note for such errors some may still exist.

## 2024-03-08 DIAGNOSIS — F90.2 ATTENTION DEFICIT HYPERACTIVITY DISORDER, COMBINED TYPE: Chronic | ICD-10-CM

## 2024-03-08 RX ORDER — DEXTROAMPHETAMINE SACCHARATE, AMPHETAMINE ASPARTATE, DEXTROAMPHETAMINE SULFATE AND AMPHETAMINE SULFATE 5; 5; 5; 5 MG/1; MG/1; MG/1; MG/1
20 TABLET ORAL 3 TIMES DAILY
Qty: 90 TABLET | Refills: 0 | Status: SHIPPED | OUTPATIENT
Start: 2024-03-08 | End: 2025-03-08

## 2024-03-08 NOTE — TELEPHONE ENCOUNTER
Rx Refill Note  Requested Prescriptions     Pending Prescriptions Disp Refills    amphetamine-dextroamphetamine (Adderall) 20 MG tablet 90 tablet 0     Sig: Take 1 tablet by mouth 3 (Three) Times a Day.      Last office visit with prescribing clinician: 1/12/2024   Last telemedicine visit with prescribing clinician: Visit date not found   Next office visit with prescribing clinician: 5/14/2024   Office Visit with Maria De Jesus Quintanilla MD (01/12/2024)   [facility] Drug Screen - (01/12/2024)                     Would you like a call back once the refill request has been completed: [] Yes [] No    If the office needs to give you a call back, can they leave a voicemail: [] Yes [] No    Teresa Corcoran MA  03/08/24, 09:41 EST    LAST FILL 2-10-24; PT WILL WAIT FOR A SHIPMENT AT [facility]; UPLOADED

## 2024-03-11 DIAGNOSIS — F33.0 MILD EPISODE OF RECURRENT MAJOR DEPRESSIVE DISORDER: Chronic | ICD-10-CM

## 2024-03-11 NOTE — TELEPHONE ENCOUNTER
Rx Refill Note  Requested Prescriptions     Pending Prescriptions Disp Refills    gabapentin (NEURONTIN) 400 MG capsule [Pharmacy Med Name: gabapentin 400 mg capsule] 90 capsule 1     Sig: TAKE 1 CAPSULE BY MOUTH 3 TIMES DAILY      Last office visit with prescribing clinician: 1/12/2024   Last telemedicine visit with prescribing clinician: Visit date not found   Next office visit with prescribing clinician: 5/14/2024   Office Visit with Maria De Jesus Quintanilla MD (01/12/2024)   [facility] Drug Screen - (01/12/2024)                     Would you like a call back once the refill request has been completed: [] Yes [] No    If the office needs to give you a call back, can they leave a voicemail: [] Yes [] No    Teresa Corcoran MA  03/11/24, 13:46 EDT

## 2024-03-12 RX ORDER — GABAPENTIN 400 MG/1
400 CAPSULE ORAL 3 TIMES DAILY
Qty: 90 CAPSULE | Refills: 1 | Status: SHIPPED | OUTPATIENT
Start: 2024-03-12

## 2024-04-09 DIAGNOSIS — F33.0 MILD EPISODE OF RECURRENT MAJOR DEPRESSIVE DISORDER: Chronic | ICD-10-CM

## 2024-04-09 DIAGNOSIS — F90.2 ATTENTION DEFICIT HYPERACTIVITY DISORDER, COMBINED TYPE: Chronic | ICD-10-CM

## 2024-04-09 NOTE — TELEPHONE ENCOUNTER
Rx Refill Note  Requested Prescriptions     Pending Prescriptions Disp Refills    amphetamine-dextroamphetamine (Adderall) 20 MG tablet 90 tablet 0     Sig: Take 1 tablet by mouth 3 (Three) Times a Day.      Last office visit with prescribing clinician: 1/12/2024   Last telemedicine visit with prescribing clinician: Visit date not found   Next office visit with prescribing clinician: 5/14/2024   Office Visit with Maria De Jesus Quintanilla MD (01/12/2024)   [facility] Drug Screen - (01/12/2024)                     Would you like a call back once the refill request has been completed: [] Yes [] No    If the office needs to give you a call back, can they leave a voicemail: [] Yes [] No    Teresa Corcoran MA  04/09/24, 14:17 EDT    Pt LVM that he would like to take 100 mg of gabapentin instead of 200 mg or four 100 mg pills a day.  I LVM to see if I understood that correctly.  He also needs a refill on Adderal sent to [facility], and he will wait for a shipment.  LAST FILL 3-11-24; UPLOADED

## 2024-04-10 RX ORDER — DEXTROAMPHETAMINE SACCHARATE, AMPHETAMINE ASPARTATE, DEXTROAMPHETAMINE SULFATE AND AMPHETAMINE SULFATE 5; 5; 5; 5 MG/1; MG/1; MG/1; MG/1
20 TABLET ORAL 3 TIMES DAILY
Qty: 90 TABLET | Refills: 0 | Status: SHIPPED | OUTPATIENT
Start: 2024-04-10 | End: 2025-04-10

## 2024-04-10 RX ORDER — GABAPENTIN 100 MG/1
100 CAPSULE ORAL 4 TIMES DAILY
Qty: 120 CAPSULE | Refills: 0 | Status: SHIPPED | OUTPATIENT
Start: 2024-04-10

## 2024-05-08 DIAGNOSIS — F90.2 ATTENTION DEFICIT HYPERACTIVITY DISORDER, COMBINED TYPE: Chronic | ICD-10-CM

## 2024-05-09 RX ORDER — DEXTROAMPHETAMINE SACCHARATE, AMPHETAMINE ASPARTATE, DEXTROAMPHETAMINE SULFATE AND AMPHETAMINE SULFATE 5; 5; 5; 5 MG/1; MG/1; MG/1; MG/1
20 TABLET ORAL 3 TIMES DAILY
Qty: 90 TABLET | Refills: 0 | Status: SHIPPED | OUTPATIENT
Start: 2024-05-09 | End: 2025-05-09

## 2024-05-14 ENCOUNTER — OFFICE VISIT (OUTPATIENT)
Dept: PSYCHIATRY | Facility: CLINIC | Age: 44
End: 2024-05-14
Payer: COMMERCIAL

## 2024-05-14 VITALS — SYSTOLIC BLOOD PRESSURE: 126 MMHG | HEART RATE: 72 BPM | DIASTOLIC BLOOD PRESSURE: 68 MMHG

## 2024-05-14 DIAGNOSIS — F33.0 MILD EPISODE OF RECURRENT MAJOR DEPRESSIVE DISORDER: Chronic | ICD-10-CM

## 2024-05-14 DIAGNOSIS — F90.2 ATTENTION DEFICIT HYPERACTIVITY DISORDER, COMBINED TYPE: Primary | Chronic | ICD-10-CM

## 2024-05-14 PROCEDURE — 1159F MED LIST DOCD IN RCRD: CPT | Performed by: PSYCHIATRY & NEUROLOGY

## 2024-05-14 PROCEDURE — 1160F RVW MEDS BY RX/DR IN RCRD: CPT | Performed by: PSYCHIATRY & NEUROLOGY

## 2024-05-14 PROCEDURE — 99214 OFFICE O/P EST MOD 30 MIN: CPT | Performed by: PSYCHIATRY & NEUROLOGY

## 2024-05-14 RX ORDER — GABAPENTIN 100 MG/1
100 CAPSULE ORAL 4 TIMES DAILY
Qty: 120 CAPSULE | Refills: 3 | Status: SHIPPED | OUTPATIENT
Start: 2024-05-14

## 2024-05-14 NOTE — PROGRESS NOTES
Subjective   Francisco Javier Chavarria is a 44 y.o. male who presents today for follow up    Chief Complaint:    decreased concentration , some mood fluctuations         History of Present Illness: the pt c/o difficulties with concentration since he was at school, at that time, he was day dreaming, did not finish  HS, had trouble finishing tasks, difficult to read, his parents did not pay attention, not supporting meds.  In 11 grade he had issues with attendance, because he could not keep everything under control, was trying to do one step at a time, he was 1:1 with the teacher he was doing better.  He also had troubles with impulsivity , fidgety, had troubles due to behavior   He had the same issues at home with parents, was forgetful   Now he noticed that concentration affects his job, he works as  and he needs help with a lot of things, hard to multitask, can not stay focused on his work , he was trying to avoid activity that require concentration, he frequently takes work to his home to finish without interruptions   Concentration also affects his family life   When the pt is under pressure he is making errors, sometimes even can not spell his name   Sleep - good   Mood - overwhelmed when can not complete his task  Anxiety - increased worries about minor little things, anxiety is associated with increased tension, irritability   Denied sxs of alexa /hypomania     Today the pt reported staying busy at work, he is  back in electric business,  works with co-workers, but not very happy about his situation now, takes long time to commute , then he has limited time to what he need to be done at home  The pt likes his routine and gets tense when he needs to make some changes      Concentration is better on adderall and it helps him to stay focused  during the day    Anxiety - more manageable    Denied feeling depressed  Qelbree - not effective     The following portions of the patient's history were reviewed and  updated as appropriate: allergies, current medications, past family history, past medical history, past social history, past surgical history and problem list.    PAST PSYCHIATRIC HISTORY  Axis I  No inpt , no SI, no SAs   Axis II  Defer     PAST OUTPATIENT TREATMENT  Diagnosis treated:  Anxiety/Panic Disorder, ADD  Treatment Type:  meds   Prior Psychiatric Medications:  Prozac, zoloft, lexapro - sedation   wellbutrin - not effective  trintellix - side effects     Support Groups:  None   Sequelae Of Mental Disorder:  job disruption, social isolation, emotional distress          Interval History  No changes      Side Effects  Denied       Past Medical History:  Past Medical History:   Diagnosis Date    Allergic     Anxiety     Depression     Headache        Social History:  Social History     Socioeconomic History    Marital status: Single   Tobacco Use    Smoking status: Some Days     Types: Cigarettes    Smokeless tobacco: Current   Vaping Use    Vaping status: Never Used   Substance and Sexual Activity    Alcohol use: Yes     Alcohol/week: 4.0 - 5.0 standard drinks of alcohol     Types: 4 - 5 Cans of beer per week     Comment: once per month     Drug use: No    Sexual activity: Defer       Family History:  Family History   Problem Relation Age of Onset    Depression Mother     Anxiety disorder Mother     Other Mother     Mental illness Mother     Cancer Father     Depression Sister     Anxiety disorder Sister     Other Sister        Past Surgical History:  History reviewed. No pertinent surgical history.    Problem List:  Patient Active Problem List   Diagnosis    Irritable bowel syndrome with diarrhea    Mild episode of recurrent major depressive disorder    Right ear pain    Sting of hornets, wasps, and bees causing poisoning and toxic reactions    Physical exam    Testosterone deficiency    Attention deficit hyperactivity disorder, combined type       Allergy:   Allergies   Allergen Reactions    Wasp Venom  Hives     The patient states last time this happened, he has never been the same since.    Seasonal Ic [Cholestatin] Itching     Certain things         Discontinued Medications:  Medications Discontinued During This Encounter   Medication Reason    gabapentin (NEURONTIN) 100 MG capsule Reorder             Current Medications:   Current Outpatient Medications   Medication Sig Dispense Refill    gabapentin (NEURONTIN) 100 MG capsule Take 1 capsule by mouth 4 (Four) Times a Day. 120 capsule 3    amphetamine-dextroamphetamine (Adderall) 20 MG tablet Take 1 tablet by mouth 3 (Three) Times a Day. 90 tablet 0    dicyclomine (BENTYL) 20 MG tablet Take 1 tablet by mouth Every 6 (Six) Hours. 120 tablet 3     No current facility-administered medications for this visit.         Psychological ROS: positive for - anxiety  and concentration difficulties  negative for - hallucinations, hostility, irritability, memory difficulties, mood swings, physical abuse or sexual abuse      Physical Exam:   Blood pressure 126/68, pulse 72.    Mental Status Exam:   Hygiene:   good  Cooperation:  Cooperative  Eye Contact:  Good  Psychomotor Behavior:  Appropriate  Affect:  Appropriate  Mood: fluctates  Hopelessness: Denies  Speech:  Normal  Thought Process:  Goal directed and Linear  Thought Content:  Mood congruent  Suicidal:  None  Homicidal:  None  Hallucinations:  None  Delusion:  None  Memory:  Intact  Orientation:  Person, Place, Time and Situation  Reliability:  good  Insight:  Good  Judgement:  Fair  Impulse Control:  Fair  Physical/Medical Issues:  No      MSE from 1/12/24   reviewed and no  changes necessary     PHQ-9 Depression Screening  Little interest or pleasure in doing things? 1-->several days   Feeling down, depressed, or hopeless? 1-->several days   Trouble falling or staying asleep, or sleeping too much? 0-->not at all   Feeling tired or having little energy? 1-->several days   Poor appetite or overeating? 0-->not at all    Feeling bad about yourself - or that you are a failure or have let yourself or your family down? 1-->several days   Trouble concentrating on things, such as reading the newspaper or watching television? 2-->more than half the days   Moving or speaking so slowly that other people could have noticed? Or the opposite - being so fidgety or restless that you have been moving around a lot more than usual? 0-->not at all   Thoughts that you would be better off dead, or of hurting yourself in some way? 0-->not at all   PHQ-9 Total Score 6   If you checked off any problems, how difficult have these problems made it for you to do your work, take care of things at home, or get along with other people? somewhat difficult       Francisco Javier Chavarria  reports that he has been smoking cigarettes. He uses smokeless tobacco.. I have educated him on the risk of diseases from using tobacco products such as cancer, COPD and heart disease.     I advised him to quit and he is not willing to quit.    I spent 3  minutes counseling the patient.         Never smoker     I advised Francisco Javier of the risks of tobacco use.     Lab Results:   No visits with results within 3 Month(s) from this visit.   Latest known visit with results is:   Clinical Support on 04/22/2021   Component Date Value Ref Range Status    WBC 04/22/2021 8.1  3.4 - 10.8 x10E3/uL Final    RBC 04/22/2021 4.79  4.14 - 5.80 x10E6/uL Final    Hemoglobin 04/22/2021 14.3  13.0 - 17.7 g/dL Final    Hematocrit 04/22/2021 42.2  37.5 - 51.0 % Final    MCV 04/22/2021 88  79 - 97 fL Final    MCH 04/22/2021 29.9  26.6 - 33.0 pg Final    MCHC 04/22/2021 33.9  31.5 - 35.7 g/dL Final    RDW 04/22/2021 12.4  11.6 - 15.4 % Final    Platelets 04/22/2021 473 (H)  150 - 450 x10E3/uL Final    Neutrophil Rel % 04/22/2021 68  Not Estab. % Final    Lymphocyte Rel % 04/22/2021 20  Not Estab. % Final    Monocyte Rel % 04/22/2021 9  Not Estab. % Final    Eosinophil Rel % 04/22/2021 2  Not Estab. % Final     Basophil Rel % 04/22/2021 1  Not Estab. % Final    Neutrophils Absolute 04/22/2021 5.6  1.4 - 7.0 x10E3/uL Final    Lymphocytes Absolute 04/22/2021 1.6  0.7 - 3.1 x10E3/uL Final    Monocytes Absolute 04/22/2021 0.7  0.1 - 0.9 x10E3/uL Final    Eosinophils Absolute 04/22/2021 0.1  0.0 - 0.4 x10E3/uL Final    Basophils Absolute 04/22/2021 0.1  0.0 - 0.2 x10E3/uL Final    Immature Granulocyte Rel % 04/22/2021 0  Not Estab. % Final    Immature Grans Absolute 04/22/2021 0.0  0.0 - 0.1 x10E3/uL Final    Glucose 04/22/2021 77  65 - 99 mg/dL Final    BUN 04/22/2021 9  6 - 24 mg/dL Final    Creatinine 04/22/2021 1.06  0.76 - 1.27 mg/dL Final    eGFR Non  Am 04/22/2021 87  >59 mL/min/1.73 Final    eGFR African Am 04/22/2021 101  >59 mL/min/1.73 Final    Comment: **[facility] currently reports eGFR in compliance with the current**    recommendations of the National Kidney Foundation. [facility] will    update reporting as new guidelines are published from the NKF-ASN    Task force.      BUN/Creatinine Ratio 04/22/2021 8 (L)  9 - 20 Final    Sodium 04/22/2021 142  134 - 144 mmol/L Final    Potassium 04/22/2021 4.4  3.5 - 5.2 mmol/L Final    Chloride 04/22/2021 101  96 - 106 mmol/L Final    Total CO2 04/22/2021 26  20 - 29 mmol/L Final    Calcium 04/22/2021 10.1  8.7 - 10.2 mg/dL Final    Total Protein 04/22/2021 8.0  6.0 - 8.5 g/dL Final    Albumin 04/22/2021 5.0  4.0 - 5.0 g/dL Final    Globulin 04/22/2021 3.0  1.5 - 4.5 g/dL Final    A/G Ratio 04/22/2021 1.7  1.2 - 2.2 Final    Total Bilirubin 04/22/2021 0.4  0.0 - 1.2 mg/dL Final    Alkaline Phosphatase 04/22/2021 101  39 - 117 IU/L Final    AST (SGOT) 04/22/2021 25  0 - 40 IU/L Final    ALT (SGPT) 04/22/2021 36  0 - 44 IU/L Final    TSH 04/22/2021 1.210  0.450 - 4.500 uIU/mL Final    25 Hydroxy, Vitamin D 04/22/2021 95.0  30.0 - 100.0 ng/mL Final    Comment: Vitamin D deficiency has been defined by the [place] of  Medicine and an Endocrine Society practice guideline as  a  level of serum 25-OH vitamin D less than 20 ng/mL (1,2).  The Endocrine Society went on to further define vitamin D  insufficiency as a level between 21 and 29 ng/mL (2).  1. IOM ([place] of Medicine). 2010. Dietary reference     intakes for calcium and D. [place]: The     National Academies Press.  2. Elver MF, Lebron [place], Arnulfo [place], et al.     Evaluation, treatment, and prevention of vitamin D     deficiency: an Endocrine Society clinical practice     guideline. JCEM. 2011 Jul; 96(7):1911-30.      Vitamin B-12 04/22/2021 1,047  232 - 1,245 pg/mL Final    Folate 04/22/2021 7.0  >3.0 ng/mL Final    Comment: A serum folate concentration of less than 3.1 ng/mL is  considered to represent clinical deficiency.      Testosterone, Total 04/22/2021 391  264 - 916 ng/dL Final    Comment: Adult male reference interval is based on a population of  healthy nonobese males (BMI <30) between 19 and 39 years old.  Charline et.al. JCEM 2017,102;4055-5766. PMID: 23115908.       **Effective May 10, 2021 Testosterone, Serum**         reference interval will be changing to:               Age                Male          Female            0 - 30 days          0 - 650        4 - 190            1 -  5 months        0 - 650        0 -  42                 6 months        0 -  36        0 -  42            7m-  1 year          0 -  36        1 -  26            2 -  5 years         0 -  36        3 -  33            6 -  8 years         0 -  36        3 -  25            9 - 10 years         0 -  21        1 -  33                11 years         1 - 161        5 -  58                12 years         2 - 521        5 -  58           13 - 15 years        28 - 656       12 -  71           16 - 17 years       150 - 785       12 -  71           18 - 19 years       150 - 785       13 -  71                                      20 - 30 years       264 - 916       13 -  71           31 - 40 years       264 - 916        8 -  60            41 - 60 years       264 - 916        4 -  50           61 - 80 years       264 - 916        3 -  67               >80 years       264 - 916        2 -  45      Testosterone, Free 04/22/2021 5.9 (L)  6.8 - 21.5 pg/mL Final    Vitamin B1, Whole Blood 04/22/2021 CANCELED  nmol/L Final-Edited    Comment: Test not performed. No frozen whole blood received.    Result canceled by the ancillary.      Specimen Status 04/22/2021 CANCELED   Final-Edited    Comment: Test not performed. No frozen whole blood received.        TEST:  [id number]  Vitamin B1 (Thiamine), Blood    Result canceled by the ancillary.         Assessment/Plan   Problems Addressed this Visit       Mild episode of recurrent major depressive disorder (Chronic)    Relevant Medications    gabapentin (NEURONTIN) 100 MG capsule    Attention deficit hyperactivity disorder, combined type - Primary (Chronic)     Diagnoses         Codes Comments    Attention deficit hyperactivity disorder, combined type    -  Primary ICD-10-CM: F90.2  ICD-9-CM: 314.01     Mild episode of recurrent major depressive disorder     ICD-10-CM: F33.0  ICD-9-CM: 296.31             Visit Diagnoses:    ICD-10-CM ICD-9-CM   1. Attention deficit hyperactivity disorder, combined type  F90.2 314.01   2. Mild episode of recurrent major depressive disorder  F33.0 296.31             TREATMENT PLAN/GOALS: Continue supportive psychotherapy efforts and medications as indicated. Treatment and medication options discussed during today's visit. Patient ackowledged and verbally consented to continue with current treatment plan and was educated on the importance of compliance with treatment and follow-up appointments.    MEDICATION ISSUES:  INSPECT reviewed as expected - 5/11/24   adderall # 90     1. ADHD  Combined - cont  Adderall  20 mg TID for now  , no changes , tolerates well    Advised not to take stimulants on weekends     2. MDD-  Consider  Therapy, but not ready now due to time issues    SSRis  were offered, he declined for now since he does not have much time yet   Cont neurotnin     3. Long term therapeutic drug monitoring - UDS 2/9/2022 consistent for adderall , also+ for ethyl glucuronide ,    1/10/23 - consistent     LABORATORY - SCAN - [facility], [facility], 1/10/2023 (01/10/2023)   UDS 1/12/24   LABORATORY - SCAN - [facility] SCREEN, [facility], 01/12/2024 (01/12/2024)     Patient screened positive for depression based on a PHQ-9 score of 6 on 5/14/2024. Follow-up recommendations include:  the pt is on stimulants for adhd  .    PHQ scored 6  and indicated moderate   depression   JACLYN 7 scored 6    Discussed medication options and treatment plan of prescribed medication as well as the risks, benefits, and side effects including potential falls, possible impaired driving and metabolic adversities among others. Patient is agreeable to call the office with any worsening of symptoms or onset of side effects. Patient is agreeable to call 911 or go to the nearest ER should he/she begin having SI/HI. No medication side effects or related complaints today.     MEDS ORDERED DURING VISIT:  New Medications Ordered This Visit   Medications    gabapentin (NEURONTIN) 100 MG capsule     Sig: Take 1 capsule by mouth 4 (Four) Times a Day.     Dispense:  120 capsule     Refill:  3       Return in about 4 months (around 9/14/2024).         This document has been electronically signed by Maria De Jesus Quintanilla MD  May 14, 2024 15:46 EDT    EMR Dragon transcription disclaimer:  Some of this encounter note is an electronic transcription translation of spoken language to printed text. The electronic translation of spoken language may permit erroneous, or at times, nonsensical words or phrases to be inadvertently transcribed; Although I have reviewed the note for such errors some may still exist.

## 2024-06-11 DIAGNOSIS — F90.2 ATTENTION DEFICIT HYPERACTIVITY DISORDER, COMBINED TYPE: Chronic | ICD-10-CM

## 2024-06-11 RX ORDER — DEXTROAMPHETAMINE SACCHARATE, AMPHETAMINE ASPARTATE, DEXTROAMPHETAMINE SULFATE AND AMPHETAMINE SULFATE 5; 5; 5; 5 MG/1; MG/1; MG/1; MG/1
20 TABLET ORAL 3 TIMES DAILY
Qty: 90 TABLET | Refills: 0 | Status: SHIPPED | OUTPATIENT
Start: 2024-06-11 | End: 2025-06-11

## 2024-06-11 NOTE — TELEPHONE ENCOUNTER
Rx Refill Note  Requested Prescriptions     Pending Prescriptions Disp Refills    amphetamine-dextroamphetamine (Adderall) 20 MG tablet 90 tablet 0     Sig: Take 1 tablet by mouth 3 (Three) Times a Day.      Last office visit with prescribing clinician: 5/14/2024   Last telemedicine visit with prescribing clinician: Visit date not found   Next office visit with prescribing clinician: 9/10/2024   Office Visit with Maria De Jesus Quintanilla MD (05/14/2024)   [facility] Drug Screen - (01/12/2024)                     Would you like a call back once the refill request has been completed: [] Yes [] No    If the office needs to give you a call back, can they leave a voicemail: [] Yes [] No    Teresa Corcoran MA  06/11/24, 10:31 EDT    LAST FILL 5-11-24; PT SAYS HE WILL WAIT FOR A SHIPMENT AT [facility] IN [place]; UPLOADED

## 2024-07-12 DIAGNOSIS — F90.2 ATTENTION DEFICIT HYPERACTIVITY DISORDER, COMBINED TYPE: Chronic | ICD-10-CM

## 2024-07-12 RX ORDER — DEXTROAMPHETAMINE SACCHARATE, AMPHETAMINE ASPARTATE, DEXTROAMPHETAMINE SULFATE AND AMPHETAMINE SULFATE 5; 5; 5; 5 MG/1; MG/1; MG/1; MG/1
20 TABLET ORAL 3 TIMES DAILY
Qty: 90 TABLET | Refills: 0 | Status: SHIPPED | OUTPATIENT
Start: 2024-07-12 | End: 2025-07-12

## 2024-07-12 NOTE — TELEPHONE ENCOUNTER
Rx Refill Note  Requested Prescriptions     Pending Prescriptions Disp Refills    amphetamine-dextroamphetamine (Adderall) 20 MG tablet 90 tablet 0     Sig: Take 1 tablet by mouth 3 (Three) Times a Day.      Last office visit with prescribing clinician: 5/14/2024     Next office visit with prescribing clinician: 9/10/2024     Office Visit with Maria De Jesus Quintanilla MD (05/14/2024)     [facility] Urine Drug Screen - (01/12/2024)     Med check - 9-    Last Inspect fill: 6-    Alia Low MA  07/12/24, 09:58 EDT

## 2024-08-09 DIAGNOSIS — F90.2 ATTENTION DEFICIT HYPERACTIVITY DISORDER, COMBINED TYPE: Chronic | ICD-10-CM

## 2024-08-09 RX ORDER — DEXTROAMPHETAMINE SACCHARATE, AMPHETAMINE ASPARTATE, DEXTROAMPHETAMINE SULFATE AND AMPHETAMINE SULFATE 5; 5; 5; 5 MG/1; MG/1; MG/1; MG/1
20 TABLET ORAL 3 TIMES DAILY
Qty: 90 TABLET | Refills: 0 | Status: SHIPPED | OUTPATIENT
Start: 2024-08-09 | End: 2025-08-09

## 2024-08-09 NOTE — TELEPHONE ENCOUNTER
Rx Refill Note  Requested Prescriptions     Pending Prescriptions Disp Refills    amphetamine-dextroamphetamine (Adderall) 20 MG tablet 90 tablet 0     Sig: Take 1 tablet by mouth 3 (Three) Times a Day.      Last office visit with prescribing clinician: 5/14/2024   Last telemedicine visit with prescribing clinician: Visit date not found   Next office visit with prescribing clinician: 9/10/2024   Office Visit with Maria De Jesus Quintanilla MD (05/14/2024)   [facility] Abbreviated Urine Drug Screen - (01/12/2024)                     Would you like a call back once the refill request has been completed: [] Yes [] No    If the office needs to give you a call back, can they leave a voicemail: [] Yes [] No    Teresa Corcoran MA  08/09/24, 14:25 EDT    LAST FILL 7-13-24; UPLOADED; PT SAYS HE WILL WAIT FOR A SHIPMENT AT [facility] IN [place];

## 2024-09-10 ENCOUNTER — OFFICE VISIT (OUTPATIENT)
Dept: PSYCHIATRY | Facility: CLINIC | Age: 44
End: 2024-09-10
Payer: COMMERCIAL

## 2024-09-10 DIAGNOSIS — F90.2 ATTENTION DEFICIT HYPERACTIVITY DISORDER, COMBINED TYPE: Primary | Chronic | ICD-10-CM

## 2024-09-10 DIAGNOSIS — F33.0 MILD EPISODE OF RECURRENT MAJOR DEPRESSIVE DISORDER: Chronic | ICD-10-CM

## 2024-09-10 PROCEDURE — 1160F RVW MEDS BY RX/DR IN RCRD: CPT | Performed by: PSYCHIATRY & NEUROLOGY

## 2024-09-10 PROCEDURE — 99214 OFFICE O/P EST MOD 30 MIN: CPT | Performed by: PSYCHIATRY & NEUROLOGY

## 2024-09-10 PROCEDURE — 1159F MED LIST DOCD IN RCRD: CPT | Performed by: PSYCHIATRY & NEUROLOGY

## 2024-09-10 RX ORDER — DEXTROAMPHETAMINE SACCHARATE, AMPHETAMINE ASPARTATE, DEXTROAMPHETAMINE SULFATE AND AMPHETAMINE SULFATE 5; 5; 5; 5 MG/1; MG/1; MG/1; MG/1
20 TABLET ORAL 3 TIMES DAILY
Qty: 90 TABLET | Refills: 0 | Status: SHIPPED | OUTPATIENT
Start: 2024-09-10 | End: 2025-09-10

## 2024-09-10 NOTE — PROGRESS NOTES
Subjective   Francisco Javier Chavarria is a 44 y.o. male who presents today for follow up    Chief Complaint:    decreased concentration , some mood fluctuations         History of Present Illness: the pt c/o difficulties with concentration since he was at school, at that time, he was day dreaming, did not finish  HS, had trouble finishing tasks, difficult to read, his parents did not pay attention, not supporting meds.  In 11 grade he had issues with attendance, because he could not keep everything under control, was trying to do one step at a time, he was 1:1 with the teacher he was doing better.  He also had troubles with impulsivity , fidgety, had troubles due to behavior   He had the same issues at home with parents, was forgetful   Now he noticed that concentration affects his job, he works as  and he needs help with a lot of things, hard to multitask, can not stay focused on his work , he was trying to avoid activity that require concentration, he frequently takes work to his home to finish without interruptions   Concentration also affects his family life   When the pt is under pressure he is making errors, sometimes even can not spell his name   Sleep - good   Mood - overwhelmed when can not complete his task  Anxiety - increased worries about minor little things, anxiety is associated with increased tension, irritability   Denied sxs of alexa /hypomania     Today the pt reported no major changes in mood,   Denied feeling depressed/hopeless/helpless, denied AVH/Si/HI  Concentration - good on meds, productive at work   The pt likes his routine and gets tense when he needs to make some changes   Anxiety - more manageable     Increased appetite in the evening     The following portions of the patient's history were reviewed and updated as appropriate: allergies, current medications, past family history, past medical history, past social history, past surgical history and problem list.    PAST PSYCHIATRIC  HISTORY  Axis I  No inpt , no SI, no SAs   Axis II  Defer     PAST OUTPATIENT TREATMENT  Diagnosis treated:  Anxiety/Panic Disorder, ADD  Treatment Type:  meds   Prior Psychiatric Medications:  Prozac, zoloft, lexapro - sedation   wellbutrin - not effective  trintellix - side effects     Support Groups:  None   Sequelae Of Mental Disorder:  job disruption, social isolation, emotional distress          Interval History  No changes,stable      Side Effects  Denied       Past Medical History:  Past Medical History:   Diagnosis Date    Allergic     Anxiety     Depression     Headache        Social History:  Social History     Socioeconomic History    Marital status: Single   Tobacco Use    Smoking status: Some Days     Types: Cigarettes    Smokeless tobacco: Current   Vaping Use    Vaping status: Never Used   Substance and Sexual Activity    Alcohol use: Yes     Alcohol/week: 4.0 - 5.0 standard drinks of alcohol     Types: 4 - 5 Cans of beer per week     Comment: once per month     Drug use: No    Sexual activity: Defer       Family History:  Family History   Problem Relation Age of Onset    Depression Mother     Anxiety disorder Mother     Other Mother     Mental illness Mother     Cancer Father     Depression Sister     Anxiety disorder Sister     Other Sister        Past Surgical History:  History reviewed. No pertinent surgical history.    Problem List:  Patient Active Problem List   Diagnosis    Irritable bowel syndrome with diarrhea    Mild episode of recurrent major depressive disorder    Right ear pain    Sting of hornets, wasps, and bees causing poisoning and toxic reactions    Physical exam    Testosterone deficiency    Attention deficit hyperactivity disorder, combined type       Allergy:   Allergies   Allergen Reactions    Wasp Venom Hives     The patient states last time this happened, he has never been the same since.    Seasonal Ic [Cholestatin] Itching     Certain things         Discontinued  Medications:  Medications Discontinued During This Encounter   Medication Reason    amphetamine-dextroamphetamine (Adderall) 20 MG tablet Reorder               Current Medications:   Current Outpatient Medications   Medication Sig Dispense Refill    amphetamine-dextroamphetamine (Adderall) 20 MG tablet Take 1 tablet by mouth 3 (Three) Times a Day. 90 tablet 0    dicyclomine (BENTYL) 20 MG tablet Take 1 tablet by mouth Every 6 (Six) Hours. 120 tablet 3    gabapentin (NEURONTIN) 100 MG capsule Take 1 capsule by mouth 4 (Four) Times a Day. 120 capsule 3     No current facility-administered medications for this visit.         Psychological ROS: positive for - anxiety  and concentration difficulties  negative for - hallucinations, hostility, irritability, memory difficulties, mood swings, physical abuse or sexual abuse      Physical Exam:   There were no vitals taken for this visit.    Mental Status Exam:   Hygiene:   good  Cooperation:  Cooperative  Eye Contact:  Good  Psychomotor Behavior:  Appropriate  Affect:  Appropriate  Mood: fluctates  Hopelessness: Denies  Speech:  Normal  Thought Process:  Goal directed and Linear  Thought Content:  Mood congruent  Suicidal:  None  Homicidal:  None  Hallucinations:  None  Delusion:  None  Memory:  Intact  Orientation:  Person, Place, Time and Situation  Reliability:  good  Insight:  Good  Judgement:  Fair  Impulse Control:  Fair  Physical/Medical Issues:  No      MSE from 5/14/24   reviewed and no  changes necessary     PHQ-9 Depression Screening  Little interest or pleasure in doing things? 1-->several days   Feeling down, depressed, or hopeless? 1-->several days   Trouble falling or staying asleep, or sleeping too much? 0-->not at all   Feeling tired or having little energy? 2-->more than half the days   Poor appetite or overeating? 0-->not at all   Feeling bad about yourself - or that you are a failure or have let yourself or your family down? 0-->not at all   Trouble  concentrating on things, such as reading the newspaper or watching television? 2-->more than half the days   Moving or speaking so slowly that other people could have noticed? Or the opposite - being so fidgety or restless that you have been moving around a lot more than usual? 0-->not at all   Thoughts that you would be better off dead, or of hurting yourself in some way? 0-->not at all   PHQ-9 Total Score 6   If you checked off any problems, how difficult have these problems made it for you to do your work, take care of things at home, or get along with other people? somewhat difficult       Francisco Javier Chavarria  reports that he has been smoking cigarettes. He uses smokeless tobacco.. I have educated him on the risk of diseases from using tobacco products such as cancer, COPD and heart disease.     I advised him to quit and he is not willing to quit.    I spent 3  minutes counseling the patient.         Never smoker     I advised Francisco Javier of the risks of tobacco use.     Lab Results:   No visits with results within 3 Month(s) from this visit.   Latest known visit with results is:   Clinical Support on 04/22/2021   Component Date Value Ref Range Status    WBC 04/22/2021 8.1  3.4 - 10.8 x10E3/uL Final    RBC 04/22/2021 4.79  4.14 - 5.80 x10E6/uL Final    Hemoglobin 04/22/2021 14.3  13.0 - 17.7 g/dL Final    Hematocrit 04/22/2021 42.2  37.5 - 51.0 % Final    MCV 04/22/2021 88  79 - 97 fL Final    MCH 04/22/2021 29.9  26.6 - 33.0 pg Final    MCHC 04/22/2021 33.9  31.5 - 35.7 g/dL Final    RDW 04/22/2021 12.4  11.6 - 15.4 % Final    Platelets 04/22/2021 473 (H)  150 - 450 x10E3/uL Final    Neutrophil Rel % 04/22/2021 68  Not Estab. % Final    Lymphocyte Rel % 04/22/2021 20  Not Estab. % Final    Monocyte Rel % 04/22/2021 9  Not Estab. % Final    Eosinophil Rel % 04/22/2021 2  Not Estab. % Final    Basophil Rel % 04/22/2021 1  Not Estab. % Final    Neutrophils Absolute 04/22/2021 5.6  1.4 - 7.0 x10E3/uL Final    Lymphocytes  Absolute 04/22/2021 1.6  0.7 - 3.1 x10E3/uL Final    Monocytes Absolute 04/22/2021 0.7  0.1 - 0.9 x10E3/uL Final    Eosinophils Absolute 04/22/2021 0.1  0.0 - 0.4 x10E3/uL Final    Basophils Absolute 04/22/2021 0.1  0.0 - 0.2 x10E3/uL Final    Immature Granulocyte Rel % 04/22/2021 0  Not Estab. % Final    Immature Grans Absolute 04/22/2021 0.0  0.0 - 0.1 x10E3/uL Final    Glucose 04/22/2021 77  65 - 99 mg/dL Final    BUN 04/22/2021 9  6 - 24 mg/dL Final    Creatinine 04/22/2021 1.06  0.76 - 1.27 mg/dL Final    eGFR Non  Am 04/22/2021 87  >59 mL/min/1.73 Final    eGFR African Am 04/22/2021 101  >59 mL/min/1.73 Final    Comment: **[facility] currently reports eGFR in compliance with the current**    recommendations of the National Kidney Foundation. [facility] will    update reporting as new guidelines are published from the NKF-ASN    Task force.      BUN/Creatinine Ratio 04/22/2021 8 (L)  9 - 20 Final    Sodium 04/22/2021 142  134 - 144 mmol/L Final    Potassium 04/22/2021 4.4  3.5 - 5.2 mmol/L Final    Chloride 04/22/2021 101  96 - 106 mmol/L Final    Total CO2 04/22/2021 26  20 - 29 mmol/L Final    Calcium 04/22/2021 10.1  8.7 - 10.2 mg/dL Final    Total Protein 04/22/2021 8.0  6.0 - 8.5 g/dL Final    Albumin 04/22/2021 5.0  4.0 - 5.0 g/dL Final    Globulin 04/22/2021 3.0  1.5 - 4.5 g/dL Final    A/G Ratio 04/22/2021 1.7  1.2 - 2.2 Final    Total Bilirubin 04/22/2021 0.4  0.0 - 1.2 mg/dL Final    Alkaline Phosphatase 04/22/2021 101  39 - 117 IU/L Final    AST (SGOT) 04/22/2021 25  0 - 40 IU/L Final    ALT (SGPT) 04/22/2021 36  0 - 44 IU/L Final    TSH 04/22/2021 1.210  0.450 - 4.500 uIU/mL Final    25 Hydroxy, Vitamin D 04/22/2021 95.0  30.0 - 100.0 ng/mL Final    Comment: Vitamin D deficiency has been defined by the [place] of  Medicine and an Endocrine Society practice guideline as a  level of serum 25-OH vitamin D less than 20 ng/mL (1,2).  The Endocrine Society went on to further define vitamin  D  insufficiency as a level between 21 and 29 ng/mL (2).  1. IOM ([place] of Medicine). 2010. Dietary reference     intakes for calcium and D. [place]: The     National Academies Press.  2. Elver MF, Lebron DEJESUS, Arnulfo MO, et al.     Evaluation, treatment, and prevention of vitamin D     deficiency: an Endocrine Society clinical practice     guideline. JCEM. 2011 Jul; 96(7):1911-30.      Vitamin B-12 04/22/2021 1,047  232 - 1,245 pg/mL Final    Folate 04/22/2021 7.0  >3.0 ng/mL Final    Comment: A serum folate concentration of less than 3.1 ng/mL is  considered to represent clinical deficiency.      Testosterone, Total 04/22/2021 391  264 - 916 ng/dL Final    Comment: Adult male reference interval is based on a population of  healthy nonobese males (BMI <30) between 19 and 39 years old.  Charline, et.al. JCEM 2017,102;2106-2091. PMID: 05825988.       **Effective May 10, 2021 Testosterone, Serum**         reference interval will be changing to:               Age                Male          Female            0 - 30 days          0 - 650        4 - 190            1 -  5 months        0 - 650        0 -  42                 6 months        0 -  36        0 -  42            7m-  1 year          0 -  36        1 -  26            2 -  5 years         0 -  36        3 -  33            6 -  8 years         0 -  36        3 -  25            9 - 10 years         0 -  21        1 -  33                11 years         1 - 161        5 -  58                12 years         2 - 521        5 -  58           13 - 15 years        28 - 656       12 -  71           16 - 17 years       150 - 785       12 -  71           18 - 19 years       150 - 785       13 -  71                                      20 - 30 years       264 - 916       13 -  71           31 - 40 years       264 - 916        8 -  60           41 - 60 years       264 - 916        4 -  50           61 - 80 years       264 - 916        3 -  67                >80 years       264 - 916        2 -  45      Testosterone, Free 04/22/2021 5.9 (L)  6.8 - 21.5 pg/mL Final    Vitamin B1, Whole Blood 04/22/2021 CANCELED  nmol/L Final-Edited    Comment: Test not performed. No frozen whole blood received.    Result canceled by the ancillary.      Specimen Status 04/22/2021 CANCELED   Final-Edited    Comment: Test not performed. No frozen whole blood received.        TEST:  [id number]  Vitamin B1 (Thiamine), Blood    Result canceled by the ancillary.         Assessment/Plan   Problems Addressed this Visit       Mild episode of recurrent major depressive disorder (Chronic)    Relevant Medications    amphetamine-dextroamphetamine (Adderall) 20 MG tablet    Attention deficit hyperactivity disorder, combined type - Primary (Chronic)    Relevant Medications    amphetamine-dextroamphetamine (Adderall) 20 MG tablet     Diagnoses         Codes Comments    Attention deficit hyperactivity disorder, combined type    -  Primary ICD-10-CM: F90.2  ICD-9-CM: 314.01     Mild episode of recurrent major depressive disorder     ICD-10-CM: F33.0  ICD-9-CM: 296.31             Visit Diagnoses:    ICD-10-CM ICD-9-CM   1. Attention deficit hyperactivity disorder, combined type  F90.2 314.01   2. Mild episode of recurrent major depressive disorder  F33.0 296.31               TREATMENT PLAN/GOALS: Continue supportive psychotherapy efforts and medications as indicated. Treatment and medication options discussed during today's visit. Patient ackowledged and verbally consented to continue with current treatment plan and was educated on the importance of compliance with treatment and follow-up appointments.    MEDICATION ISSUES:  INSPECT reviewed as expected - 8/12/24   adderall # 90     1. ADHD  Combined - cont  Adderall  20 mg TID for now  , no changes , tolerates well    Advised not to take stimulants on weekends, BP is stable, no chest pain      2. MDD-  Consider  Therapy, but not ready now due to time issues     SSRis were offered, he declined for now since he does not have much time yet   Cont neurotnin     3. Long term therapeutic drug monitoring - UDS 2/9/2022 consistent for adderall , also+ for ethyl glucuronide ,    1/10/23 - consistent     LABORATORY - SCAN - [facility], [facility], 1/10/2023 (01/10/2023)   UDS 1/12/24   LABORATORY - SCAN - [facility]UG SCREEN, [facility], 01/12/2024 (01/12/2024)     Patient screened positive for depression based on a PHQ-9 score of 6 on 9/10/2024. Follow-up recommendations include:  the pt is on stimulants for adhd  .    PHQ scored 6  and indicated moderate   depression   JACLYN 7 scored 4    Discussed medication options and treatment plan of prescribed medication as well as the risks, benefits, and side effects including potential falls, possible impaired driving and metabolic adversities among others. Patient is agreeable to call the office with any worsening of symptoms or onset of side effects. Patient is agreeable to call 911 or go to the nearest ER should he/she begin having SI/HI. No medication side effects or related complaints today.     MEDS ORDERED DURING VISIT:  New Medications Ordered This Visit   Medications    amphetamine-dextroamphetamine (Adderall) 20 MG tablet     Sig: Take 1 tablet by mouth 3 (Three) Times a Day.     Dispense:  90 tablet     Refill:  0       Return in about 4 months (around 1/10/2025).         This document has been electronically signed by Maria De Jesus Quintanilla MD  September 10, 2024 15:36 EDT    EMR Dragon transcription disclaimer:  Some of this encounter note is an electronic transcription translation of spoken language to printed text. The electronic translation of spoken language may permit erroneous, or at times, nonsensical words or phrases to be inadvertently transcribed; Although I have reviewed the note for such errors some may still exist.

## 2024-10-09 DIAGNOSIS — F90.2 ATTENTION DEFICIT HYPERACTIVITY DISORDER, COMBINED TYPE: Chronic | ICD-10-CM

## 2024-10-09 NOTE — TELEPHONE ENCOUNTER
Rx Refill Note  Requested Prescriptions     Pending Prescriptions Disp Refills    amphetamine-dextroamphetamine (Adderall) 20 MG tablet 90 tablet 0     Sig: Take 1 tablet by mouth 3 (Three) Times a Day.      Last office visit with prescribing clinician: 9/10/2024   Last telemedicine visit with prescribing clinician: Visit date not found   Next office visit with prescribing clinician: 1/14/2025   Office Visit with Maria De Jesus Quintanilla MD (09/10/2024)   [facility] Abbreviated Urine Drug Screen - (01/12/2024)                     Would you like a call back once the refill request has been completed: [] Yes [] No    If the office needs to give you a call back, can they leave a voicemail: [] Yes [] No    Teresa Corcoran MA  10/09/24, 16:02 EDT    LAST FILL 9-11-24; PT SAYS HE WILL WAIT FOR A SHIPMENT AT [facility] IN [place]; UPLOADED

## 2024-10-10 RX ORDER — DEXTROAMPHETAMINE SACCHARATE, AMPHETAMINE ASPARTATE, DEXTROAMPHETAMINE SULFATE AND AMPHETAMINE SULFATE 5; 5; 5; 5 MG/1; MG/1; MG/1; MG/1
20 TABLET ORAL 3 TIMES DAILY
Qty: 90 TABLET | Refills: 0 | Status: SHIPPED | OUTPATIENT
Start: 2024-10-10 | End: 2025-10-10

## 2024-10-12 DIAGNOSIS — F33.0 MILD EPISODE OF RECURRENT MAJOR DEPRESSIVE DISORDER: Chronic | ICD-10-CM

## 2024-10-14 NOTE — TELEPHONE ENCOUNTER
Rx Refill Note  Requested Prescriptions     Pending Prescriptions Disp Refills    gabapentin (NEURONTIN) 100 MG capsule [Pharmacy Med Name: gabapentin 100 mg capsule] 120 capsule 3     Sig: TAKE 1 CAPSULE BY MOUTH FOUR TIMES DAILY      Last office visit with prescribing clinician: 9/10/2024   Last telemedicine visit with prescribing clinician: Visit date not found   Next office visit with prescribing clinician: 1/14/2025   Office Visit with Maria De Jesus Quintanilla MD (09/10/2024)                       Would you like a call back once the refill request has been completed: [] Yes [] No    If the office needs to give you a call back, can they leave a voicemail: [] Yes [] No    Teresa Corcoran MA  10/14/24, 13:46 EDT

## 2024-10-15 RX ORDER — GABAPENTIN 100 MG/1
100 CAPSULE ORAL 4 TIMES DAILY
Qty: 120 CAPSULE | Refills: 3 | Status: SHIPPED | OUTPATIENT
Start: 2024-10-15

## 2024-11-11 DIAGNOSIS — F90.2 ATTENTION DEFICIT HYPERACTIVITY DISORDER, COMBINED TYPE: Chronic | ICD-10-CM

## 2024-11-11 NOTE — TELEPHONE ENCOUNTER
Rx Refill Note  Requested Prescriptions     Pending Prescriptions Disp Refills    amphetamine-dextroamphetamine (Adderall) 20 MG tablet 90 tablet 0     Sig: Take 1 tablet by mouth 3 (Three) Times a Day.      Last office visit with prescribing clinician: 9/10/2024   Last telemedicine visit with prescribing clinician: Visit date not found   Next office visit with prescribing clinician: 1/14/2025   Office Visit with Maria De Jesus Quintanilla MD (09/10/2024)   [facility] Abbreviated Urine Drug Screen - (01/12/2024)                     Would you like a call back once the refill request has been completed: [] Yes [] No    If the office needs to give you a call back, can they leave a voicemail: [] Yes [] No    Teresa Corcoran MA  11/11/24, 13:18 EST    LAST FILL 10-12-24; PT SAYS HE WILL WAIT FOR A SHIPMENT AT [facility] IN [place]; UPLOADED

## 2024-11-12 RX ORDER — DEXTROAMPHETAMINE SACCHARATE, AMPHETAMINE ASPARTATE, DEXTROAMPHETAMINE SULFATE AND AMPHETAMINE SULFATE 5; 5; 5; 5 MG/1; MG/1; MG/1; MG/1
20 TABLET ORAL 3 TIMES DAILY
Qty: 90 TABLET | Refills: 0 | Status: SHIPPED | OUTPATIENT
Start: 2024-11-12 | End: 2025-11-12

## 2024-12-11 DIAGNOSIS — F90.2 ATTENTION DEFICIT HYPERACTIVITY DISORDER, COMBINED TYPE: Chronic | ICD-10-CM

## 2024-12-11 NOTE — TELEPHONE ENCOUNTER
Rx Refill Note  Requested Prescriptions     Pending Prescriptions Disp Refills    amphetamine-dextroamphetamine (Adderall) 20 MG tablet 90 tablet 0     Sig: Take 1 tablet by mouth 3 (Three) Times a Day.      Last office visit with prescribing clinician: 9/10/2024   Last telemedicine visit with prescribing clinician: Visit date not found   Next office visit with prescribing clinician: 1/14/2025   Office Visit with Maria De Jesus Quintanilla MD (09/10/2024)   [facility] [place] Urine Drug Screen - (01/12/2024)                     Would you like a call back once the refill request has been completed: [] Yes [] No    If the office needs to give you a call back, can they leave a voicemail: [] Yes [] No    Teresa Corcoran MA  12/11/24, 14:40 EST    LAST FILL 11-13-24; PT WILL WAIT FOR A SHIPMENT AT [facility] IN [place]; UPLOADED

## 2024-12-12 RX ORDER — DEXTROAMPHETAMINE SACCHARATE, AMPHETAMINE ASPARTATE, DEXTROAMPHETAMINE SULFATE AND AMPHETAMINE SULFATE 5; 5; 5; 5 MG/1; MG/1; MG/1; MG/1
20 TABLET ORAL 3 TIMES DAILY
Qty: 90 TABLET | Refills: 0 | Status: SHIPPED | OUTPATIENT
Start: 2024-12-12 | End: 2025-12-12

## 2025-01-10 DIAGNOSIS — F90.2 ATTENTION DEFICIT HYPERACTIVITY DISORDER, COMBINED TYPE: Chronic | ICD-10-CM

## 2025-01-10 DIAGNOSIS — F33.0 MILD EPISODE OF RECURRENT MAJOR DEPRESSIVE DISORDER: Chronic | ICD-10-CM

## 2025-01-10 NOTE — TELEPHONE ENCOUNTER
Rx Refill Note  Requested Prescriptions     Pending Prescriptions Disp Refills    amphetamine-dextroamphetamine (Adderall) 20 MG tablet 90 tablet 0     Sig: Take 1 tablet by mouth 3 (Three) Times a Day.    gabapentin (NEURONTIN) 100 MG capsule 120 capsule 3     Sig: Take 1 capsule by mouth 4 (Four) Times a Day.      Last office visit with prescribing clinician: 9/10/2024   Last telemedicine visit with prescribing clinician: Visit date not found   Next office visit with prescribing clinician: 1/14/2025   Office Visit with Maria De Jesus Quintanilla MD (09/10/2024)   [facility] Abbreviated Urine Drug Screen - (01/12/2024)                     Would you like a call back once the refill request has been completed: [] Yes [] No    If the office needs to give you a call back, can they leave a voicemail: [] Yes [] No    Teresa Corcoran MA  01/10/25, 13:47 EST    LAST FILL 12-14-24; PT WILL WAIT FOR A SHIPMENT AT [facility] IN [place]; UPLOADED

## 2025-01-12 RX ORDER — GABAPENTIN 100 MG/1
100 CAPSULE ORAL 4 TIMES DAILY
Qty: 120 CAPSULE | Refills: 3 | Status: SHIPPED | OUTPATIENT
Start: 2025-01-12

## 2025-01-12 RX ORDER — DEXTROAMPHETAMINE SACCHARATE, AMPHETAMINE ASPARTATE, DEXTROAMPHETAMINE SULFATE AND AMPHETAMINE SULFATE 5; 5; 5; 5 MG/1; MG/1; MG/1; MG/1
20 TABLET ORAL 3 TIMES DAILY
Qty: 90 TABLET | Refills: 0 | Status: SHIPPED | OUTPATIENT
Start: 2025-01-12 | End: 2026-01-12

## 2025-01-14 ENCOUNTER — OFFICE VISIT (OUTPATIENT)
Dept: PSYCHIATRY | Facility: CLINIC | Age: 45
End: 2025-01-14
Payer: COMMERCIAL

## 2025-01-14 DIAGNOSIS — Z79.899 ENCOUNTER FOR LONG-TERM (CURRENT) USE OF MEDICATIONS: ICD-10-CM

## 2025-01-14 DIAGNOSIS — F33.0 MILD EPISODE OF RECURRENT MAJOR DEPRESSIVE DISORDER: Primary | Chronic | ICD-10-CM

## 2025-01-14 DIAGNOSIS — F90.2 ATTENTION DEFICIT HYPERACTIVITY DISORDER, COMBINED TYPE: Chronic | ICD-10-CM

## 2025-01-14 PROCEDURE — 1160F RVW MEDS BY RX/DR IN RCRD: CPT | Performed by: PSYCHIATRY & NEUROLOGY

## 2025-01-14 PROCEDURE — 99214 OFFICE O/P EST MOD 30 MIN: CPT | Performed by: PSYCHIATRY & NEUROLOGY

## 2025-01-14 PROCEDURE — 1159F MED LIST DOCD IN RCRD: CPT | Performed by: PSYCHIATRY & NEUROLOGY

## 2025-01-14 PROCEDURE — 96127 BRIEF EMOTIONAL/BEHAV ASSMT: CPT | Performed by: PSYCHIATRY & NEUROLOGY

## 2025-01-14 RX ORDER — GABAPENTIN 100 MG/1
100 CAPSULE ORAL 4 TIMES DAILY
Qty: 120 CAPSULE | Refills: 2 | Status: SHIPPED | OUTPATIENT
Start: 2025-01-14

## 2025-01-14 NOTE — PROGRESS NOTES
Subjective   Francisco Javier Chavarria is a 44 y.o. male who presents today for follow up    Chief Complaint:    decreased concentration , some mood fluctuations         History of Present Illness: the pt c/o difficulties with concentration since he was at school, at that time, he was day dreaming, did not finish  HS, had trouble finishing tasks, difficult to read, his parents did not pay attention, not supporting meds.  In 11 grade he had issues with attendance, because he could not keep everything under control, was trying to do one step at a time, he was 1:1 with the teacher he was doing better.  He also had troubles with impulsivity , fidgety, had troubles due to behavior   He had the same issues at home with parents, was forgetful   Now he noticed that concentration affects his job, he works as  and he needs help with a lot of things, hard to multitask, can not stay focused on his work , he was trying to avoid activity that require concentration, he frequently takes work to his home to finish without interruptions   Concentration also affects his family life   When the pt is under pressure he is making errors, sometimes even can not spell his name   Sleep - good   Mood - overwhelmed when can not complete his task  Anxiety - increased worries about minor little things, anxiety is associated with increased tension, irritability   Denied sxs of alexa /hypomania     Today the pt reported no major changes in mood, stable on current meds   Denied feeling depressed/hopeless/helpless, denied AVH/Si/HI  Concentration - good on meds, productive at work , improved task completion on meds   The pt likes his routine and gets tense when he needs to make some changes   Anxiety - more manageable     Increased appetite in the evening     The following portions of the patient's history were reviewed and updated as appropriate: allergies, current medications, past family history, past medical history, past social history, past  surgical history and problem list.    PAST PSYCHIATRIC HISTORY  Axis I  No inpt , no SI, no SAs   Axis II  Defer     PAST OUTPATIENT TREATMENT  Diagnosis treated:  Anxiety/Panic Disorder, ADD  Treatment Type:  meds   Prior Psychiatric Medications:  Prozac, zoloft, lexapro - sedation   wellbutrin - not effective  trintellix - side effects     Support Groups:  None   Sequelae Of Mental Disorder:  job disruption, social isolation, emotional distress          Interval History  No changes,stable      Side Effects  Denied       Past Medical History:  Past Medical History:   Diagnosis Date    Allergic     Anxiety     Depression     Headache        Social History:  Social History     Socioeconomic History    Marital status: Single   Tobacco Use    Smoking status: Some Days     Types: Cigarettes    Smokeless tobacco: Current   Vaping Use    Vaping status: Never Used   Substance and Sexual Activity    Alcohol use: Yes     Alcohol/week: 4.0 - 5.0 standard drinks of alcohol     Types: 4 - 5 Cans of beer per week     Comment: once per month     Drug use: No    Sexual activity: Defer       Family History:  Family History   Problem Relation Age of Onset    Depression Mother     Anxiety disorder Mother     Other Mother     Mental illness Mother     Cancer Father     Depression Sister     Anxiety disorder Sister     Other Sister        Past Surgical History:  No past surgical history on file.    Problem List:  Patient Active Problem List   Diagnosis    Irritable bowel syndrome with diarrhea    Mild episode of recurrent major depressive disorder    Right ear pain    Sting of hornets, wasps, and bees causing poisoning and toxic reactions    Physical exam    Testosterone deficiency    Attention deficit hyperactivity disorder, combined type    Encounter for long-term (current) use of medications       Allergy:   Allergies   Allergen Reactions    Wasp Venom Hives     The patient states last time this happened, he has never been the same  since.    Seasonal Ic [Cholestatin] Itching     Certain things         Discontinued Medications:  Medications Discontinued During This Encounter   Medication Reason    gabapentin (NEURONTIN) 100 MG capsule Reorder                 Current Medications:   Current Outpatient Medications   Medication Sig Dispense Refill    gabapentin (NEURONTIN) 100 MG capsule Take 1 capsule by mouth 4 (Four) Times a Day. 120 capsule 2    amphetamine-dextroamphetamine (Adderall) 20 MG tablet Take 1 tablet by mouth 3 (Three) Times a Day. 90 tablet 0    dicyclomine (BENTYL) 20 MG tablet Take 1 tablet by mouth Every 6 (Six) Hours. 120 tablet 3     No current facility-administered medications for this visit.         Psychological ROS: positive for - anxiety  and concentration difficulties  negative for - hallucinations, hostility, irritability, memory difficulties, mood swings, physical abuse or sexual abuse      Physical Exam:   There were no vitals taken for this visit.    Mental Status Exam:   Hygiene:   good  Cooperation:  Cooperative  Eye Contact:  Good  Psychomotor Behavior:  Appropriate  Affect:  Appropriate  Mood: fluctates  Hopelessness: Denies  Speech:  Normal  Thought Process:  Goal directed and Linear  Thought Content:  Mood congruent  Suicidal:  None  Homicidal:  None  Hallucinations:  None  Delusion:  None  Memory:  Intact  Orientation:  Person, Place, Time and Situation  Reliability:  good  Insight:  Good  Judgement:  Fair  Impulse Control:  Fair  Physical/Medical Issues:  No      MSE from 9/10/24   reviewed and no  changes necessary     PHQ-9 Depression Screening  Little interest or pleasure in doing things? Several days   Feeling down, depressed, or hopeless? Several days   PHQ-2 Total Score 2   Trouble falling or staying asleep, or sleeping too much? Not at all   Feeling tired or having little energy? Not at all   Poor appetite or overeating? Not at all   Feeling bad about yourself - or that you are a failure or have let  yourself or your family down? Not at all   Trouble concentrating on things, such as reading the newspaper or watching television? Over half   Moving or speaking so slowly that other people could have noticed? Or the opposite - being so fidgety or restless that you have been moving around a lot more than usual? Not at all   Thoughts that you would be better off dead, or of hurting yourself in some way? Not at all   PHQ-9 Total Score 4   If you checked off any problems, how difficult have these problems made it for you to do your work, take care of things at home, or get along with other people? Somewhat difficult    Over the last two weeks, how often have you been bothered by the following problems?  Feeling nervous, anxious or on edge: Several days  Not being able to stop or control worrying: Not at all  Worrying too much about different things: Several days  Trouble Relaxing: Several days  Being so restless that it is hard to sit still: Not at all  Becoming easily annoyed or irritable: Not at all  Feeling afraid as if something awful might happen: Several days  JACLYN 7 Total Score: 4  If you checked any problems, how difficult have these problems made it for you to do your work, take care of things at home, or get along with other people: Somewhat difficult       Francisco Javier Chavarria  reports that he has been smoking cigarettes. He uses smokeless tobacco.. I have educated him on the risk of diseases from using tobacco products such as cancer, COPD and heart disease.     I advised him to quit and he is not willing to quit.    I spent 3  minutes counseling the patient.         Never smoker     I advised Francisco Javier of the risks of tobacco use.     Lab Results:   No visits with results within 3 Month(s) from this visit.   Latest known visit with results is:   Clinical Support on 04/22/2021   Component Date Value Ref Range Status    WBC 04/22/2021 8.1  3.4 - 10.8 x10E3/uL Final    RBC 04/22/2021 4.79  4.14 - 5.80 x10E6/uL Final     Hemoglobin 04/22/2021 14.3  13.0 - 17.7 g/dL Final    Hematocrit 04/22/2021 42.2  37.5 - 51.0 % Final    MCV 04/22/2021 88  79 - 97 fL Final    MCH 04/22/2021 29.9  26.6 - 33.0 pg Final    MCHC 04/22/2021 33.9  31.5 - 35.7 g/dL Final    RDW 04/22/2021 12.4  11.6 - 15.4 % Final    Platelets 04/22/2021 473 (H)  150 - 450 x10E3/uL Final    Neutrophil Rel % 04/22/2021 68  Not Estab. % Final    Lymphocyte Rel % 04/22/2021 20  Not Estab. % Final    Monocyte Rel % 04/22/2021 9  Not Estab. % Final    Eosinophil Rel % 04/22/2021 2  Not Estab. % Final    Basophil Rel % 04/22/2021 1  Not Estab. % Final    Neutrophils Absolute 04/22/2021 5.6  1.4 - 7.0 x10E3/uL Final    Lymphocytes Absolute 04/22/2021 1.6  0.7 - 3.1 x10E3/uL Final    Monocytes Absolute 04/22/2021 0.7  0.1 - 0.9 x10E3/uL Final    Eosinophils Absolute 04/22/2021 0.1  0.0 - 0.4 x10E3/uL Final    Basophils Absolute 04/22/2021 0.1  0.0 - 0.2 x10E3/uL Final    Immature Granulocyte Rel % 04/22/2021 0  Not Estab. % Final    Immature Grans Absolute 04/22/2021 0.0  0.0 - 0.1 x10E3/uL Final    Glucose 04/22/2021 77  65 - 99 mg/dL Final    BUN 04/22/2021 9  6 - 24 mg/dL Final    Creatinine 04/22/2021 1.06  0.76 - 1.27 mg/dL Final    eGFR Non  Am 04/22/2021 87  >59 mL/min/1.73 Final    eGFR African Am 04/22/2021 101  >59 mL/min/1.73 Final    Comment: **[facility] currently reports eGFR in compliance with the current**    recommendations of the National Kidney Foundation. [facility] will    update reporting as new guidelines are published from the NKF-ASN    Task force.      BUN/Creatinine Ratio 04/22/2021 8 (L)  9 - 20 Final    Sodium 04/22/2021 142  134 - 144 mmol/L Final    Potassium 04/22/2021 4.4  3.5 - 5.2 mmol/L Final    Chloride 04/22/2021 101  96 - 106 mmol/L Final    Total CO2 04/22/2021 26  20 - 29 mmol/L Final    Calcium 04/22/2021 10.1  8.7 - 10.2 mg/dL Final    Total Protein 04/22/2021 8.0  6.0 - 8.5 g/dL Final    Albumin 04/22/2021 5.0  4.0 - 5.0 g/dL  Final    Globulin 04/22/2021 3.0  1.5 - 4.5 g/dL Final    A/G Ratio 04/22/2021 1.7  1.2 - 2.2 Final    Total Bilirubin 04/22/2021 0.4  0.0 - 1.2 mg/dL Final    Alkaline Phosphatase 04/22/2021 101  39 - 117 IU/L Final    AST (SGOT) 04/22/2021 25  0 - 40 IU/L Final    ALT (SGPT) 04/22/2021 36  0 - 44 IU/L Final    TSH 04/22/2021 1.210  0.450 - 4.500 uIU/mL Final    25 Hydroxy, Vitamin D 04/22/2021 95.0  30.0 - 100.0 ng/mL Final    Comment: Vitamin D deficiency has been defined by the [place] [employer]  [employer] and an Endocrine Society practice guideline as a  level of serum 25-OH vitamin D less than 20 ng/mL (1,2).  The Endocrine Society went on to further define vitamin D  insufficiency as a level between 21 and 29 ng/mL (2).  1. IOM ([place] [employer]). 2010. Dietary reference     intakes for calcium and D. [place]: The     National Academies Press.  2. Elver MF, Lebron [place], Arnulfo [place], et al.     Evaluation, treatment, and prevention of vitamin D     deficiency: an Endocrine Society clinical practice     guideline. JCEM. 2011 Jul; 96(7):1911-30.      Vitamin B-12 04/22/2021 1,047  232 - 1,245 pg/mL Final    Folate 04/22/2021 7.0  >3.0 ng/mL Final    Comment: A serum folate concentration of less than 3.1 ng/mL is  considered to represent clinical deficiency.      Testosterone, Total 04/22/2021 391  264 - 916 ng/dL Final    Comment: Adult male reference interval is based on a population of  healthy nonobese males (BMI <30) between 19 and 39 years old.  Charline et.al. JCEM 2017,102;1261-7414. PMID: [id number].       **Effective May 10, 2021 Testosterone, Serum**         reference interval will be changing to:               Age                Male          Female            0 - 30 days          0 - 650        4 - 190            1 -  5 months        0 - 650        0 -  42                 6 months        0 -  36        0 -  42            7m-  1 year          0 -  36        1 -  26            2 -  5 years          0 -  36        3 -  33            6 -  8 years         0 -  36        3 -  25            9 - 10 years         0 -  21        1 -  33                11 years         1 - 161        5 -  58                12 years         2 - 521        5 -  58           13 - 15 years        28 - 656       12 -  71           16 - 17 years       150 - 785       12 -  71           18 - 19 years       150 - 785       13 -  71                                      20 - 30 years       264 - 916       13 -  71           31 - 40 years       264 - 916        8 -  60           41 - 60 years       264 - 916        4 -  50           61 - 80 years       264 - 916        3 -  67               >80 years       264 - 916        2 -  45      Testosterone, Free 04/22/2021 5.9 (L)  6.8 - 21.5 pg/mL Final    Vitamin B1, Whole Blood 04/22/2021 CANCELED  nmol/L Final-Edited    Comment: Test not performed. No frozen whole blood received.    Result canceled by the ancillary.      Specimen Status 04/22/2021 CANCELED   Final-Edited    Comment: Test not performed. No frozen whole blood received.        TEST:  [id number]  Vitamin B1 (Thiamine), Blood    Result canceled by the ancillary.         Assessment/Plan   Problems Addressed this Visit       Mild episode of recurrent major depressive disorder - Primary (Chronic)    Relevant Medications    gabapentin (NEURONTIN) 100 MG capsule    Other Relevant Orders    [facility] Abbreviated Urine Drug Screen -    Attention deficit hyperactivity disorder, combined type (Chronic)    Relevant Orders    [facility] Abbreviated Urine Drug Screen -    Encounter for long-term (current) use of medications    Relevant Orders    [facility] Abbreviated Urine Drug Screen -     Diagnoses         Codes Comments    Mild episode of recurrent major depressive disorder    -  Primary ICD-10-CM: F33.0  ICD-9-CM: 296.31     Attention deficit hyperactivity disorder, combined type     ICD-10-CM: F90.2  ICD-9-CM: 314.01     Encounter for long-term  (current) use of medications     ICD-10-CM: Z79.899  ICD-9-CM: V58.69             Visit Diagnoses:    ICD-10-CM ICD-9-CM   1. Mild episode of recurrent major depressive disorder  F33.0 296.31   2. Attention deficit hyperactivity disorder, combined type  F90.2 314.01   3. Encounter for long-term (current) use of medications  Z79.899 V58.69                 TREATMENT PLAN/GOALS: Continue supportive psychotherapy efforts and medications as indicated. Treatment and medication options discussed during today's visit. Patient ackowledged and verbally consented to continue with current treatment plan and was educated on the importance of compliance with treatment and follow-up appointments.    MEDICATION ISSUES:  INSPECT reviewed as expected - 12/13/24   adderall # 90     1. ADHD  Combined - cont  Adderall  20 mg TID for now  , no changes , tolerates well    Advised not to take stimulants on weekends, BP is stable, no chest pain      2. MDD-  Consider  Therapy, but not ready now due to time issues    SSRis were offered, he declined for now since he does not have much time yet   Cont neurotnin- effective, no changes necessary      3. Long term therapeutic drug monitoring - UDS 2/9/2022 consistent for adderall , also+ for ethyl glucuronide ,    1/10/23 - consistent     LABORATORY - SCAN - [facility] SCREEN, [facility], 1/10/2023 (01/10/2023)   UDS 1/12/24   LABORATORY - SCAN - [facility] DRUG SCREEN, [facility], 01/12/2024 (01/12/2024)     Patient screened positive for depression based on a PHQ-9 score of 4 on 1/14/2025. Follow-up recommendations include:  the pt is on stimulants for adhd  .    PHQ scored 4   and indicated mild    depression   JACLYN 7 scored 4 mild anxiety     Discussed medication options and treatment plan of prescribed medication as well as the risks, benefits, and side effects including potential falls, possible impaired driving and metabolic adversities among others. Patient is agreeable to call the  office with any worsening of symptoms or onset of side effects. Patient is agreeable to call 911 or go to the nearest ER should he/she begin having SI/HI. No medication side effects or related complaints today.     MEDS ORDERED DURING VISIT:  New Medications Ordered This Visit   Medications    gabapentin (NEURONTIN) 100 MG capsule     Sig: Take 1 capsule by mouth 4 (Four) Times a Day.     Dispense:  120 capsule     Refill:  2     This prescription was filled on 10/12/2024. Any refills authorized will be placed on file.       Return in about 4 months (around 5/14/2025).         This document has been electronically signed by Maria De Jesus Quintanilla MD  January 14, 2025 16:06 EST    EMR Dragon transcription disclaimer:  Some of this encounter note is an electronic transcription translation of spoken language to printed text. The electronic translation of spoken language may permit erroneous, or at times, nonsensical words or phrases to be inadvertently transcribed; Although I have reviewed the note for such errors some may still exist.

## 2025-02-11 DIAGNOSIS — F90.2 ATTENTION DEFICIT HYPERACTIVITY DISORDER, COMBINED TYPE: Chronic | ICD-10-CM

## 2025-02-11 NOTE — TELEPHONE ENCOUNTER
Rx Refill Note  Requested Prescriptions     Pending Prescriptions Disp Refills    amphetamine-dextroamphetamine (Adderall) 20 MG tablet 90 tablet 0     Sig: Take 1 tablet by mouth 3 (Three) Times a Day.      Last office visit with prescribing clinician: 1/14/2025   Last telemedicine visit with prescribing clinician: Visit date not found   Next office visit with prescribing clinician: 5/23/2025   Office Visit with Maria De Jesus Quintanilla MD (01/14/2025)   [facility] Abbreviated Urine Drug Screen - (01/14/2025)                     Would you like a call back once the refill request has been completed: [x] Yes [] No    If the office needs to give you a call back, can they leave a voicemail: [] Yes [] No    Teresa Corcoran MA  02/11/25, 14:03 EST    UPLOADED; PT WILL WAIT FOR A SHIPMENT AT [facility] IN [place]; LAST FILL 1-14-25

## 2025-02-12 RX ORDER — DEXTROAMPHETAMINE SACCHARATE, AMPHETAMINE ASPARTATE, DEXTROAMPHETAMINE SULFATE AND AMPHETAMINE SULFATE 5; 5; 5; 5 MG/1; MG/1; MG/1; MG/1
20 TABLET ORAL 3 TIMES DAILY
Qty: 90 TABLET | Refills: 0 | Status: SHIPPED | OUTPATIENT
Start: 2025-02-12 | End: 2026-02-12

## 2025-03-13 DIAGNOSIS — F90.2 ATTENTION DEFICIT HYPERACTIVITY DISORDER, COMBINED TYPE: Chronic | ICD-10-CM

## 2025-03-13 RX ORDER — DEXTROAMPHETAMINE SACCHARATE, AMPHETAMINE ASPARTATE, DEXTROAMPHETAMINE SULFATE AND AMPHETAMINE SULFATE 5; 5; 5; 5 MG/1; MG/1; MG/1; MG/1
20 TABLET ORAL 3 TIMES DAILY
Qty: 90 TABLET | Refills: 0 | Status: SHIPPED | OUTPATIENT
Start: 2025-03-13 | End: 2026-03-13

## 2025-03-13 NOTE — TELEPHONE ENCOUNTER
Rx Refill Note  Requested Prescriptions     Pending Prescriptions Disp Refills    amphetamine-dextroamphetamine (Adderall) 20 MG tablet 90 tablet 0     Sig: Take 1 tablet by mouth 3 (Three) Times a Day.        Last office visit with prescribing clinician: 1/14/2025     Next office visit with prescribing clinician: 5/23/2025     Office Visit with Maria De Jesus Quintanilla MD (01/14/2025)     [facility] Abbreviated Urine Drug Screen - (01/14/2025)     BEHAVIORAL HEALTH - SCAN - Inspect report, [place], 3/13/25 (03/13/2025)     Inspect Fill 2/13/25    Arlet Jacobo  03/13/25, 14:05 EDT

## 2025-04-14 DIAGNOSIS — F90.2 ATTENTION DEFICIT HYPERACTIVITY DISORDER, COMBINED TYPE: Chronic | ICD-10-CM

## 2025-04-14 NOTE — TELEPHONE ENCOUNTER
Rx Refill Note  Requested Prescriptions     Pending Prescriptions Disp Refills    amphetamine-dextroamphetamine (Adderall) 20 MG tablet 90 tablet 0     Sig: Take 1 tablet by mouth 3 (Three) Times a Day.      Last office visit with prescribing clinician: 1/14/2025     Next office visit with prescribing clinician: 5/23/2025     Office Visit with Maria De Jesus Quintanilla MD (01/14/2025)     [facility] Abbreviated Urine Drug Screen - (01/14/2025)     Med check - 5-    Last Inspect fill: 3-    Alia Low MA  04/14/25, 16:12 EDT

## 2025-04-15 ENCOUNTER — TELEPHONE (OUTPATIENT)
Dept: PSYCHIATRY | Facility: CLINIC | Age: 45
End: 2025-04-15
Payer: MEDICAID

## 2025-04-15 RX ORDER — DEXTROAMPHETAMINE SACCHARATE, AMPHETAMINE ASPARTATE, DEXTROAMPHETAMINE SULFATE AND AMPHETAMINE SULFATE 5; 5; 5; 5 MG/1; MG/1; MG/1; MG/1
20 TABLET ORAL 3 TIMES DAILY
Qty: 90 TABLET | Refills: 0 | Status: SHIPPED | OUTPATIENT
Start: 2025-04-15 | End: 2026-04-15

## 2025-04-15 NOTE — TELEPHONE ENCOUNTER
Pt lvm to see if Adderall was sent in.    I called back and unable to lvm.    Neither of his phone numbers work [phone] (disconnected) or [phone] (calling restrictions).

## 2025-05-15 DIAGNOSIS — F90.2 ATTENTION DEFICIT HYPERACTIVITY DISORDER, COMBINED TYPE: Chronic | ICD-10-CM

## 2025-05-15 RX ORDER — DEXTROAMPHETAMINE SACCHARATE, AMPHETAMINE ASPARTATE, DEXTROAMPHETAMINE SULFATE AND AMPHETAMINE SULFATE 5; 5; 5; 5 MG/1; MG/1; MG/1; MG/1
20 TABLET ORAL 3 TIMES DAILY
Qty: 90 TABLET | Refills: 0 | Status: SHIPPED | OUTPATIENT
Start: 2025-05-15 | End: 2026-05-15

## 2025-05-15 NOTE — TELEPHONE ENCOUNTER
Rx Refill Note  Requested Prescriptions     Pending Prescriptions Disp Refills    amphetamine-dextroamphetamine (Adderall) 20 MG tablet 90 tablet 0     Sig: Take 1 tablet by mouth 3 (Three) Times a Day.      Last office visit with prescribing clinician: 1/14/2025     Next office visit with prescribing clinician: 5/23/2025     Office Visit with Maria De Jesus Quintanilla MD (01/14/2025)     [facility] Abbreviated Urine Drug Screen - (01/14/2025)     BEHAVIORAL HEALTH - SCAN - Anabella, shahbaz, 1981, tere (05/15/2025)     Med check - 5-    Last Inspect fill: 4-    Alia Low MA  05/15/25, 14:40 EDT

## 2025-06-11 ENCOUNTER — OFFICE VISIT (OUTPATIENT)
Dept: PSYCHIATRY | Facility: CLINIC | Age: 45
End: 2025-06-11
Payer: MEDICAID

## 2025-06-11 DIAGNOSIS — F90.2 ATTENTION DEFICIT HYPERACTIVITY DISORDER, COMBINED TYPE: Chronic | ICD-10-CM

## 2025-06-11 DIAGNOSIS — F33.0 MILD EPISODE OF RECURRENT MAJOR DEPRESSIVE DISORDER: Primary | Chronic | ICD-10-CM

## 2025-06-11 RX ORDER — GABAPENTIN 100 MG/1
100 CAPSULE ORAL 4 TIMES DAILY
Qty: 120 CAPSULE | Refills: 3 | Status: SHIPPED | OUTPATIENT
Start: 2025-06-11

## 2025-06-11 RX ORDER — DEXTROAMPHETAMINE SACCHARATE, AMPHETAMINE ASPARTATE, DEXTROAMPHETAMINE SULFATE AND AMPHETAMINE SULFATE 5; 5; 5; 5 MG/1; MG/1; MG/1; MG/1
20 TABLET ORAL 3 TIMES DAILY
Qty: 90 TABLET | Refills: 0 | Status: SHIPPED | OUTPATIENT
Start: 2025-06-11 | End: 2026-06-11

## 2025-06-11 NOTE — PROGRESS NOTES
Subjective   Francisco Javier Chavarria is a 45 y.o. male who presents today for follow up    Chief Complaint:    decreased concentration , anxiety         History of Present Illness: the pt c/o difficulties with concentration since he was at school, at that time, he was day dreaming, did not finish  HS, had trouble finishing tasks, difficult to read, his parents did not pay attention, not supporting meds.  In 11 grade he had issues with attendance, because he could not keep everything under control, was trying to do one step at a time, he was 1:1 with the teacher he was doing better.  He also had troubles with impulsivity , fidgety, had troubles due to behavior   He had the same issues at home with parents, was forgetful   Now he noticed that concentration affects his job, he works as  and he needs help with a lot of things, hard to multitask, can not stay focused on his work , he was trying to avoid activity that require concentration, he frequently takes work to his home to finish without interruptions   Concentration also affects his family life   When the pt is under pressure he is making errors, sometimes even can not spell his name   Sleep - good   Mood - overwhelmed when can not complete his task  Anxiety - increased worries about minor little things, anxiety is associated with increased tension, irritability   Denied sxs of alexa /hypomania     Today the pt reported no major changes in mood, stable on current meds, still works as  but wants to move to [place]   Denied feeling depressed/hopeless/helpless, denied AVH/Si/HI  Concentration - good on meds, productive at work , improved task completion on meds   The pt likes his routine and gets tense when he needs to make some changes   Anxiety - more manageable    Increased appetite in the evening   No sxs of alexa/hypomania reported     The following portions of the patient's history were reviewed and updated as appropriate: allergies, current  medications, past family history, past medical history, past social history, past surgical history and problem list.    PAST PSYCHIATRIC HISTORY  Axis I  No inpt , no SI, no SAs   Axis II  Defer     PAST OUTPATIENT TREATMENT  Diagnosis treated:  Anxiety/Panic Disorder, ADD  Treatment Type:  meds   Prior Psychiatric Medications:  Prozac, zoloft, lexapro - sedation   wellbutrin - not effective  trintellix - side effects     Support Groups:  None   Sequelae Of Mental Disorder:  job disruption, social isolation, emotional distress      Interval History  No changes,stable      Side Effects  Denied       Past Medical History:  Past Medical History:   Diagnosis Date    Allergic     Anxiety     Depression     Headache        Social History:  Social History     Socioeconomic History    Marital status: Single   Tobacco Use    Smoking status: Some Days     Types: Cigarettes    Smokeless tobacco: Current   Vaping Use    Vaping status: Never Used   Substance and Sexual Activity    Alcohol use: Yes     Alcohol/week: 4.0 - 5.0 standard drinks of alcohol     Types: 4 - 5 Cans of beer per week     Comment: once per month     Drug use: No    Sexual activity: Defer       Family History:  Family History   Problem Relation Age of Onset    Depression Mother     Anxiety disorder Mother     Other Mother     Mental illness Mother     Cancer Father     Depression Sister     Anxiety disorder Sister     Other Sister        Past Surgical History:  History reviewed. No pertinent surgical history.    Problem List:  Patient Active Problem List   Diagnosis    Irritable bowel syndrome with diarrhea    Mild episode of recurrent major depressive disorder    Right ear pain    Sting of hornets, wasps, and bees causing poisoning and toxic reactions    Physical exam    Testosterone deficiency    Attention deficit hyperactivity disorder, combined type    Encounter for long-term (current) use of medications       Allergy:   Allergies   Allergen Reactions     Wasp Venom Hives     The patient states last time this happened, he has never been the same since.    Seasonal Ic [Cholestatin] Itching     Certain things         Discontinued Medications:  Medications Discontinued During This Encounter   Medication Reason    gabapentin (NEURONTIN) 100 MG capsule Reorder    amphetamine-dextroamphetamine (Adderall) 20 MG tablet Reorder           Current Medications:   Current Outpatient Medications   Medication Sig Dispense Refill    amphetamine-dextroamphetamine (Adderall) 20 MG tablet Take 1 tablet by mouth 3 (Three) Times a Day. 90 tablet 0    gabapentin (NEURONTIN) 100 MG capsule Take 1 capsule by mouth 4 (Four) Times a Day. 120 capsule 3    dicyclomine (BENTYL) 20 MG tablet Take 1 tablet by mouth Every 6 (Six) Hours. 120 tablet 3     No current facility-administered medications for this visit.         Psychological ROS: positive for - anxiety  and concentration difficulties  negative for - hallucinations, hostility, irritability, memory difficulties, mood swings, physical abuse or sexual abuse      Physical Exam:   There were no vitals taken for this visit.    Mental Status Exam:   Hygiene:   good  Cooperation:  Cooperative  Eye Contact:  Good  Psychomotor Behavior:  Appropriate  Affect:  Appropriate  Mood: fluctates  Hopelessness: Denies  Speech:  Normal  Thought Process:  Goal directed and Linear  Thought Content:  Mood congruent  Suicidal:  None  Homicidal:  None  Hallucinations:  None  Delusion:  None  Memory:  Intact  Orientation:  Person, Place, Time and Situation  Reliability:  good  Insight:  Good  Judgement:  Fair  Impulse Control:  Fair  Physical/Medical Issues:  No      MSE from 1/14/25   reviewed and no  changes necessary     PHQ-9 Depression Screening  Little interest or pleasure in doing things? Several days   Feeling down, depressed, or hopeless? Several days   PHQ-2 Total Score 2   Trouble falling or staying asleep, or sleeping too much? Not at all   Feeling  tired or having little energy? Not at all   Poor appetite or overeating? Not at all   Feeling bad about yourself - or that you are a failure or have let yourself or your family down? Over half   Trouble concentrating on things, such as reading the newspaper or watching television? Not at all   Moving or speaking so slowly that other people could have noticed? Or the opposite - being so fidgety or restless that you have been moving around a lot more than usual? Not at all   Thoughts that you would be better off dead, or of hurting yourself in some way? Not at all   PHQ-9 Total Score 4   If you checked off any problems, how difficult have these problems made it for you to do your work, take care of things at home, or get along with other people? Somewhat difficult    Over the last two weeks, how often have you been bothered by the following problems?  Feeling nervous, anxious or on edge: Several days  Not being able to stop or control worrying: Several days  Worrying too much about different things: Not at all  Trouble Relaxing: Not at all  Being so restless that it is hard to sit still: Not at all  Becoming easily annoyed or irritable: Not at all  Feeling afraid as if something awful might happen: Several days  JACLYN 7 Total Score: 3  If you checked any problems, how difficult have these problems made it for you to do your work, take care of things at home, or get along with other people: Somewhat difficult       Francisco Javier Chavarria  reports that he has been smoking cigarettes. He uses smokeless tobacco.. I have educated him on the risk of diseases from using tobacco products such as cancer, COPD and heart disease.     I advised him to quit and he is not willing to quit.    I spent 3  minutes counseling the patient.         Never smoker     I advised Francisco Javier of the risks of tobacco use.     Lab Results:   No visits with results within 3 Month(s) from this visit.   Latest known visit with results is:   Clinical Support on  04/22/2021   Component Date Value Ref Range Status    WBC 04/22/2021 8.1  3.4 - 10.8 x10E3/uL Final    RBC 04/22/2021 4.79  4.14 - 5.80 x10E6/uL Final    Hemoglobin 04/22/2021 14.3  13.0 - 17.7 g/dL Final    Hematocrit 04/22/2021 42.2  37.5 - 51.0 % Final    MCV 04/22/2021 88  79 - 97 fL Final    MCH 04/22/2021 29.9  26.6 - 33.0 pg Final    MCHC 04/22/2021 33.9  31.5 - 35.7 g/dL Final    RDW 04/22/2021 12.4  11.6 - 15.4 % Final    Platelets 04/22/2021 473 (H)  150 - 450 x10E3/uL Final    Neutrophil Rel % 04/22/2021 68  Not Estab. % Final    Lymphocyte Rel % 04/22/2021 20  Not Estab. % Final    Monocyte Rel % 04/22/2021 9  Not Estab. % Final    Eosinophil Rel % 04/22/2021 2  Not Estab. % Final    Basophil Rel % 04/22/2021 1  Not Estab. % Final    Neutrophils Absolute 04/22/2021 5.6  1.4 - 7.0 x10E3/uL Final    Lymphocytes Absolute 04/22/2021 1.6  0.7 - 3.1 x10E3/uL Final    Monocytes Absolute 04/22/2021 0.7  0.1 - 0.9 x10E3/uL Final    Eosinophils Absolute 04/22/2021 0.1  0.0 - 0.4 x10E3/uL Final    Basophils Absolute 04/22/2021 0.1  0.0 - 0.2 x10E3/uL Final    Immature Granulocyte Rel % 04/22/2021 0  Not Estab. % Final    Immature Grans Absolute 04/22/2021 0.0  0.0 - 0.1 x10E3/uL Final    Glucose 04/22/2021 77  65 - 99 mg/dL Final    BUN 04/22/2021 9  6 - 24 mg/dL Final    Creatinine 04/22/2021 1.06  0.76 - 1.27 mg/dL Final    eGFR Non  Am 04/22/2021 87  >59 mL/min/1.73 Final    eGFR African Am 04/22/2021 101  >59 mL/min/1.73 Final    Comment: **[facility] currently reports eGFR in compliance with the current**    recommendations of the National Kidney Foundation. [facility] will    update reporting as new guidelines are published from the NKF-ASN    Task force.      BUN/Creatinine Ratio 04/22/2021 8 (L)  9 - 20 Final    Sodium 04/22/2021 142  134 - 144 mmol/L Final    Potassium 04/22/2021 4.4  3.5 - 5.2 mmol/L Final    Chloride 04/22/2021 101  96 - 106 mmol/L Final    Total CO2 04/22/2021 26  20 - 29 mmol/L Final     Calcium 04/22/2021 10.1  8.7 - 10.2 mg/dL Final    Total Protein 04/22/2021 8.0  6.0 - 8.5 g/dL Final    Albumin 04/22/2021 5.0  4.0 - 5.0 g/dL Final    Globulin 04/22/2021 3.0  1.5 - 4.5 g/dL Final    A/G Ratio 04/22/2021 1.7  1.2 - 2.2 Final    Total Bilirubin 04/22/2021 0.4  0.0 - 1.2 mg/dL Final    Alkaline Phosphatase 04/22/2021 101  39 - 117 IU/L Final    AST (SGOT) 04/22/2021 25  0 - 40 IU/L Final    ALT (SGPT) 04/22/2021 36  0 - 44 IU/L Final    TSH 04/22/2021 1.210  0.450 - 4.500 uIU/mL Final    25 Hydroxy, Vitamin D 04/22/2021 95.0  30.0 - 100.0 ng/mL Final    Comment: Vitamin D deficiency has been defined by the [place] of  Medicine and an Endocrine Society practice guideline as a  level of serum 25-OH vitamin D less than 20 ng/mL (1,2).  The Endocrine Society went on to further define vitamin D  insufficiency as a level between 21 and 29 ng/mL (2).  1. IOM ([place] of [employer]). 2010. Dietary reference     intakes for calcium and D. [place]: The     National Academies Press.  2. Elver MF, Lebron [place], Arnulfo [place], et al.     Evaluation, treatment, and prevention of vitamin D     deficiency: an Endocrine Society clinical practice     guideline. JCEM. 2011 Jul; 96(7):1911-30.      Vitamin B-12 04/22/2021 1,047  232 - 1,245 pg/mL Final    Folate 04/22/2021 7.0  >3.0 ng/mL Final    Comment: A serum folate concentration of less than 3.1 ng/mL is  considered to represent clinical deficiency.      Testosterone, Total 04/22/2021 391  264 - 916 ng/dL Final    Comment: Adult male reference interval is based on a population of  healthy nonobese males (BMI <30) between 19 and 39 years old.  Charline et.al. JCEM 2017,102;2389-2290. PMID: 57407060.       **Effective May 10, 2021 Testosterone, Serum**         reference interval will be changing to:               Age                Male          Female            0 - 30 days          0 - 650        4 - 190            1 -  5 months        0 - 650         0 -  42                 6 months        0 -  36        0 -  42            7m-  1 year          0 -  36        1 -  26            2 -  5 years         0 -  36        3 -  33            6 -  8 years         0 -  36        3 -  25            9 - 10 years         0 -  21        1 -  33                11 years         1 - 161        5 -  58                12 years         2 - 521        5 -  58           13 - 15 years        28 - 656       12 -  71           16 - 17 years       150 - 785       12 -  71           18 - 19 years       150 - 785       13 -  71                                      20 - 30 years       264 - 916       13 -  71           31 - 40 years       264 - 916        8 -  60           41 - 60 years       264 - 916        4 -  50           61 - 80 years       264 - 916        3 -  67               >80 years       264 - 916        2 -  45      Testosterone, Free 04/22/2021 5.9 (L)  6.8 - 21.5 pg/mL Final    Vitamin B1, Whole Blood 04/22/2021 CANCELED  nmol/L Final-Edited    Comment: Test not performed. No frozen whole blood received.    Result canceled by the ancillary.      Specimen Status 04/22/2021 CANCELED   Final-Edited    Comment: Test not performed. No frozen whole blood received.        TEST:  [id number]  Vitamin B1 (Thiamine), Blood    Result canceled by the ancillary.         Assessment/Plan   Problems Addressed this Visit       Mild episode of recurrent major depressive disorder - Primary (Chronic)    Relevant Medications    amphetamine-dextroamphetamine (Adderall) 20 MG tablet    gabapentin (NEURONTIN) 100 MG capsule    Attention deficit hyperactivity disorder, combined type (Chronic)    Relevant Medications    amphetamine-dextroamphetamine (Adderall) 20 MG tablet     Diagnoses         Codes Comments      Mild episode of recurrent major depressive disorder    -  Primary ICD-10-CM: F33.0  ICD-9-CM: 296.31       Attention deficit hyperactivity disorder, combined type     ICD-10-CM:  F90.2  ICD-9-CM: 314.01             Visit Diagnoses:    ICD-10-CM ICD-9-CM   1. Mild episode of recurrent major depressive disorder  F33.0 296.31   2. Attention deficit hyperactivity disorder, combined type  F90.2 314.01         TREATMENT PLAN/GOALS: Continue supportive psychotherapy efforts and medications as indicated. Treatment and medication options discussed during today's visit. Patient ackowledged and verbally consented to continue with current treatment plan and was educated on the importance of compliance with treatment and follow-up appointments.    MEDICATION ISSUES:  INSPECT reviewed as expected - 5/16/25    adderall # 90     1. ADHD  Combined - cont  Adderall  20 mg TID for now  , no changes , tolerates well    Advised not to take stimulants on weekends,  stable on current meds, able to maintain his job     2. MDD-  Consider  Therapy, but not ready now due to time issues    SSRis were offered, he declined for now since he does not have much time yet   Cont neurotnin- effective, no changes necessary      3. Long term therapeutic drug monitoring - UDS 2/9/2022 consistent for adderall , also+ for ethyl glucuronide ,    1/10/23 - consistent     LABORATORY - SCAN - [facility] DRUG SCREEN, [facility], 1/10/2023 (01/10/2023)   UDS 1/12/24   LABORATORY - SCAN - ABBREVIATED URINE DRUG SCREEN, [facility], 01/12/2024 (01/12/2024)   UDS 1/14/25 - consistent   LABORATORY - SCAN - ABBREVIATED URINE DRUG SCREEN, [facility], 01/14/2025 (01/14/2025)     Patient screened positive for depression based on a PHQ-9 score of 4 on 6/11/2025. Follow-up recommendations include: the pt is on stimulants for adhd .    PHQ scored 4   and indicated mild    depression   JACLYN 7 scored 3  mild anxiety     Discussed medication options and treatment plan of prescribed medication as well as the risks, benefits, and side effects including potential falls, possible impaired driving and metabolic adversities among others. Patient is agreeable to call  the office with any worsening of symptoms or onset of side effects. Patient is agreeable to call 911 or go to the nearest ER should he/she begin having SI/HI. No medication side effects or related complaints today.     MEDS ORDERED DURING VISIT:  New Medications Ordered This Visit   Medications    amphetamine-dextroamphetamine (Adderall) 20 MG tablet     Sig: Take 1 tablet by mouth 3 (Three) Times a Day.     Dispense:  90 tablet     Refill:  0     Please dispense whne it is due    gabapentin (NEURONTIN) 100 MG capsule     Sig: Take 1 capsule by mouth 4 (Four) Times a Day.     Dispense:  120 capsule     Refill:  3       Return in about 4 months (around 10/11/2025).         This document has been electronically signed by Maria De Jesus Quintanilla MD  June 11, 2025 09:49 EDT    EMR Dragon transcription disclaimer:  Some of this encounter note is an electronic transcription translation of spoken language to printed text. The electronic translation of spoken language may permit erroneous, or at times, nonsensical words or phrases to be inadvertently transcribed; Although I have reviewed the note for such errors some may still exist.

## 2025-07-11 DIAGNOSIS — F90.2 ATTENTION DEFICIT HYPERACTIVITY DISORDER, COMBINED TYPE: Chronic | ICD-10-CM

## 2025-07-11 RX ORDER — DEXTROAMPHETAMINE SACCHARATE, AMPHETAMINE ASPARTATE, DEXTROAMPHETAMINE SULFATE AND AMPHETAMINE SULFATE 5; 5; 5; 5 MG/1; MG/1; MG/1; MG/1
20 TABLET ORAL 3 TIMES DAILY
Qty: 90 TABLET | Refills: 0 | Status: SHIPPED | OUTPATIENT
Start: 2025-07-11 | End: 2026-07-11

## 2025-07-11 NOTE — TELEPHONE ENCOUNTER
Rx Refill Note  Requested Prescriptions     Pending Prescriptions Disp Refills    amphetamine-dextroamphetamine (Adderall) 20 MG tablet 90 tablet 0     Sig: Take 1 tablet by mouth 3 (Three) Times a Day.        Last office visit with prescribing clinician: 6/11/2025     Next office visit with prescribing clinician: 10/9/2025     Office Visit with Maria De Jesus Chau MD (06/11/2025)     [facility] Abbreviated Urine Drug Screen - (01/14/2025)     BEHAVIORAL HEALTH - SCAN - INSPECTREPORT - MAYELA CHAU - 07/11/2025 (07/11/2025)     Inspect Fill SOLD ON 06/16/2025 QTY 90 FOR 30 DAYS    Priscila Lindquist MA  07/11/25, 15:12 EDT

## 2025-08-14 DIAGNOSIS — F90.2 ATTENTION DEFICIT HYPERACTIVITY DISORDER, COMBINED TYPE: Chronic | ICD-10-CM

## 2025-08-14 RX ORDER — DEXTROAMPHETAMINE SACCHARATE, AMPHETAMINE ASPARTATE, DEXTROAMPHETAMINE SULFATE AND AMPHETAMINE SULFATE 5; 5; 5; 5 MG/1; MG/1; MG/1; MG/1
20 TABLET ORAL 3 TIMES DAILY
Qty: 90 TABLET | Refills: 0 | Status: SHIPPED | OUTPATIENT
Start: 2025-08-14 | End: 2026-08-14